# Patient Record
Sex: MALE | Race: WHITE | NOT HISPANIC OR LATINO | Employment: OTHER | URBAN - METROPOLITAN AREA
[De-identification: names, ages, dates, MRNs, and addresses within clinical notes are randomized per-mention and may not be internally consistent; named-entity substitution may affect disease eponyms.]

---

## 2023-08-10 ENCOUNTER — HOSPITAL ENCOUNTER (OUTPATIENT)
Dept: DATA CONVERSION | Facility: HOSPITAL | Age: 63
End: 2023-08-10
Attending: OPHTHALMOLOGY | Admitting: OPHTHALMOLOGY
Payer: COMMERCIAL

## 2023-08-10 DIAGNOSIS — H04.552 ACQUIRED STENOSIS OF LEFT NASOLACRIMAL DUCT: ICD-10-CM

## 2023-08-27 PROBLEM — S61.208A: Status: ACTIVE | Noted: 2023-08-27

## 2023-08-27 PROBLEM — T14.8XXA WOUND INFECTION: Status: ACTIVE | Noted: 2023-08-27

## 2023-08-27 PROBLEM — R04.0 EPISTAXIS: Status: ACTIVE | Noted: 2023-08-27

## 2023-08-27 PROBLEM — G51.9 ABNORMALITY OF FACIAL NERVE: Status: ACTIVE | Noted: 2023-08-27

## 2023-08-27 PROBLEM — K21.9 GERD (GASTROESOPHAGEAL REFLUX DISEASE): Status: ACTIVE | Noted: 2023-08-27

## 2023-08-27 PROBLEM — H02.532 EYELID RETRACTION RIGHT LOWER EYELID: Status: ACTIVE | Noted: 2023-08-27

## 2023-08-27 PROBLEM — R13.11 ORAL MOTOR DYSFUNCTION: Status: ACTIVE | Noted: 2023-08-27

## 2023-08-27 PROBLEM — S01.80XA OPEN WOUND OF FACE, COMPLICATED: Status: ACTIVE | Noted: 2023-08-27

## 2023-08-27 PROBLEM — S01.80XA: Status: ACTIVE | Noted: 2023-08-27

## 2023-08-27 PROBLEM — S62.639B OPEN FRACTURE OF FINGER, DISTAL PHALANX: Status: ACTIVE | Noted: 2023-08-27

## 2023-08-27 PROBLEM — J32.0 CHRONIC MAXILLARY SINUSITIS: Status: ACTIVE | Noted: 2023-08-27

## 2023-08-27 PROBLEM — S05.40XA: Status: ACTIVE | Noted: 2023-08-27

## 2023-08-27 PROBLEM — F41.1 GAD (GENERALIZED ANXIETY DISORDER): Status: ACTIVE | Noted: 2023-08-27

## 2023-08-27 PROBLEM — R09.81 NASAL CONGESTION: Status: ACTIVE | Noted: 2023-08-27

## 2023-08-27 PROBLEM — H90.3 SENSORINEURAL HEARING LOSS (SNHL) OF BOTH EARS: Status: ACTIVE | Noted: 2023-08-27

## 2023-08-27 PROBLEM — R11.0 NAUSEA: Status: ACTIVE | Noted: 2023-08-27

## 2023-08-27 PROBLEM — R29.898 WEAKNESS OF SHOULDER: Status: ACTIVE | Noted: 2023-08-27

## 2023-08-27 PROBLEM — S68.119A AMPUTATION FINGER: Status: ACTIVE | Noted: 2023-08-27

## 2023-08-27 PROBLEM — S68.625A: Status: ACTIVE | Noted: 2023-08-27

## 2023-08-27 PROBLEM — G24.9 DYSKINESIA: Status: ACTIVE | Noted: 2023-08-27

## 2023-08-27 PROBLEM — H02.103 ECTROPION OF RIGHT EYE: Status: ACTIVE | Noted: 2023-08-27

## 2023-08-27 PROBLEM — H93.13 TINNITUS OF BOTH EARS: Status: ACTIVE | Noted: 2023-08-27

## 2023-08-27 PROBLEM — J31.0 CHRONIC RHINITIS: Status: ACTIVE | Noted: 2023-08-27

## 2023-08-27 PROBLEM — K59.00 CONSTIPATION: Status: ACTIVE | Noted: 2023-08-27

## 2023-08-27 PROBLEM — H04.202 EPIPHORA OF LEFT SIDE: Status: ACTIVE | Noted: 2023-08-27

## 2023-08-27 PROBLEM — C44.329 SQUAMOUS CELL CANCER OF SKIN OF RIGHT CHEEK: Status: ACTIVE | Noted: 2023-08-27

## 2023-08-27 PROBLEM — C44.320 SQUAMOUS CELL CARCINOMA, FACE: Status: ACTIVE | Noted: 2023-08-27

## 2023-08-27 PROBLEM — H92.03 OTALGIA OF BOTH EARS: Status: ACTIVE | Noted: 2023-08-27

## 2023-08-27 PROBLEM — L08.9 WOUND INFECTION: Status: ACTIVE | Noted: 2023-08-27

## 2023-08-27 PROBLEM — H04.201 EPIPHORA OF RIGHT SIDE: Status: ACTIVE | Noted: 2023-08-27

## 2023-08-27 PROBLEM — G24.5 BLEPHAROSPASM: Status: ACTIVE | Noted: 2023-08-27

## 2023-08-27 PROBLEM — R22.0 FACIAL SWELLING: Status: ACTIVE | Noted: 2023-08-27

## 2023-08-27 PROBLEM — H02.112 CICATRICIAL ECTROPION OF RIGHT LOWER EYELID: Status: ACTIVE | Noted: 2023-08-27

## 2023-08-27 PROBLEM — K11.7 XEROSTOMIA: Status: ACTIVE | Noted: 2023-08-27

## 2023-08-27 PROBLEM — R22.0 CHEEK MASS: Status: ACTIVE | Noted: 2023-08-27

## 2023-08-27 PROBLEM — Q67.0 FACIAL ASYMMETRIES: Status: ACTIVE | Noted: 2023-08-27

## 2023-08-27 PROBLEM — L90.5 SCAR ADHERENT: Status: ACTIVE | Noted: 2023-08-27

## 2023-08-27 PROBLEM — F32.0 MAJOR DEPRESSIVE DISORDER, SINGLE EPISODE, MILD (CMS-HCC): Status: ACTIVE | Noted: 2023-08-27

## 2023-08-27 PROBLEM — H04.559 NLDO, ACQUIRED (NASOLACRIMAL DUCT OBSTRUCTION): Status: ACTIVE | Noted: 2023-08-27

## 2023-08-27 PROBLEM — H93.8X3 SENSATION OF FULLNESS IN BOTH EARS: Status: ACTIVE | Noted: 2023-08-27

## 2023-08-27 RX ORDER — ONDANSETRON 4 MG/1
4 TABLET, FILM COATED ORAL EVERY 6 HOURS PRN
COMMUNITY
Start: 2023-07-07 | End: 2023-10-04 | Stop reason: ALTCHOICE

## 2023-08-27 RX ORDER — IBUPROFEN 600 MG/1
600 TABLET ORAL EVERY 6 HOURS
COMMUNITY
Start: 2023-05-16 | End: 2023-10-04 | Stop reason: ALTCHOICE

## 2023-08-27 RX ORDER — GABAPENTIN 300 MG/1
1 CAPSULE ORAL 3 TIMES DAILY
COMMUNITY
Start: 2023-05-12 | End: 2023-10-04 | Stop reason: ALTCHOICE

## 2023-08-27 RX ORDER — BUPROPION HYDROCHLORIDE 150 MG/1
150 TABLET ORAL
COMMUNITY
Start: 2023-07-12 | End: 2023-10-04 | Stop reason: ALTCHOICE

## 2023-08-27 RX ORDER — VALACYCLOVIR HYDROCHLORIDE 1 G/1
2 TABLET, FILM COATED ORAL 2 TIMES DAILY
COMMUNITY
Start: 2023-05-10 | End: 2023-10-04 | Stop reason: ALTCHOICE

## 2023-08-27 RX ORDER — OXYCODONE HYDROCHLORIDE 5 MG/1
TABLET ORAL
COMMUNITY
Start: 2022-02-18 | End: 2023-10-04 | Stop reason: ALTCHOICE

## 2023-08-27 RX ORDER — BROMPHENIRAMINE MALEATE, PSEUDOEPHEDRINE HYDROCHLORIDE, AND DEXTROMETHORPHAN HYDROBROMIDE 2; 30; 10 MG/5ML; MG/5ML; MG/5ML
SYRUP ORAL
COMMUNITY
Start: 2022-05-17 | End: 2023-10-04 | Stop reason: ALTCHOICE

## 2023-08-27 RX ORDER — OXYCODONE AND ACETAMINOPHEN 5; 325 MG/1; MG/1
1 TABLET ORAL EVERY 6 HOURS PRN
COMMUNITY
Start: 2023-05-16 | End: 2023-10-04 | Stop reason: ALTCHOICE

## 2023-08-27 RX ORDER — OXCARBAZEPINE 300 MG/1
300 TABLET, FILM COATED ORAL 2 TIMES DAILY
COMMUNITY
Start: 2023-07-07 | End: 2023-10-04 | Stop reason: ALTCHOICE

## 2023-08-27 RX ORDER — OXCARBAZEPINE 150 MG/1
150 TABLET, FILM COATED ORAL 2 TIMES DAILY
COMMUNITY
Start: 2023-06-30 | End: 2023-10-04 | Stop reason: ALTCHOICE

## 2023-08-27 RX ORDER — PREGABALIN 50 MG/1
50 CAPSULE ORAL 2 TIMES DAILY
COMMUNITY
Start: 2023-06-17 | End: 2023-10-04 | Stop reason: ALTCHOICE

## 2023-09-30 NOTE — H&P
History of Present Illness:   History Present Illness:  Reason for surgery: left acquired NLDO (carboplatin  vs radiation induced)   HPI:    Patient is a 62 y/o M with left acquired NLDO (carboplatin vs radiation induced) who is undergoing left dacryocystorhinostomy with Arvizu tube on 8/10/23.    Allergies:        Allergies:  ·  No Known Allergies :     Home Medication Review:   Home Medications Reviewed: yes     Impression/Procedure:   ·  Impression and Planned Procedure: left acquired NLDO (carboplatin vs radiation induced) who is undergoing left dacryocystorhinostomy with Arvizu tube       ERAS (Enhanced Recovery After Surgery):  ·  ERAS Patient: no       Physical Exam by System:    Constitutional: Well developed, awake/alert/oriented  x3, no distress, alert and cooperative   Eyes: left nasolacrimal duct obstruction   Respiratory/Thorax: CTAB, no wheezes, rales, rhonci   Cardiovascular: Regular, rate and rhythm, no murmurs,  2+ equal pulses of the extremities, normal S 1and S 2   Gastrointestinal: Nondistended, soft, non-tender,  no rebound tenderness or guarding, no masses palpable, no organomegaly, +BS, no bruits   Psychological: Appropriate mood and behavior     Consent:   COVID-19 Consent:  ·  COVID-19 Risk Consent Surgeon has reviewed key risks related to the risk of yuniel COVID-19 and if they contract COVID-19 what the risks are.     Attestation:   Note Completion:  I am a:  Resident/Fellow   Attending Attestation I saw and evaluated the patient.  I personally obtained the key and critical portions of the history and physical exam or was physically present for key and  critical portions performed by the resident/fellow. I reviewed the resident/fellow?s documentation and discussed the patient with the resident/fellow.  I agree with the resident/fellow?s medical decision making as documented in the note.     I personally evaluated the patient on 09-Aug-2023         Electronic  Signatures:  Elier Echavarria (Resident))  (Signed 09-Aug-2023 17:33)   Authored: History of Present Illness, Allergies, Home  Medication Review, Impression/Procedure, ERAS, Physical Exam, Consent, Note Completion  Albert Ziegler)  (Signed 10-Aug-2023 08:42)   Authored: Note Completion   Co-Signer: History of Present Illness, Allergies, Home Medication Review, Impression/Procedure, ERAS, Physical Exam, Consent, Note Completion      Last Updated: 10-Aug-2023 08:42 by Albert Ziegler)

## 2023-10-01 NOTE — OP NOTE
Post Operative Note:     PreOp Diagnosis: Left nasolacrimal duct obstruction,  Left epiphora   Post-Procedure Diagnosis: Left nasolacrimal duct  obstruction, Left epiphora   Procedure: 1. Left external dacryocystorhinostomy   2. Left Curry-canalicular Spence tube   Surgeon: Albert Ziegler MD   Resident/Fellow/Other Assistant: Esha Kowalski MD,  Elier Echavarria MD   Anesthesia: General   Estimated Blood Loss (mL): <2 cc   Specimen: no   Complications: None   Findings: Left nasolacrimal duct obstruction     Operative Report Dictated:  Dictation: not applicable - note contains Operative  Report   Operative Report:    Preoperative diagnosis: left  nasolacrimal duct obstruction  Postoperative diagnosis: same  Procedure: left dacryocystorhinostomy (DCR), Left monocanalicular spence  tube  Surgeon: Albert Ziegler who was present and scrubbed throughout all critical portions of the operation  Assistants: Esha Kowalski MD and Elier Echavarria MD  Anesthesia: General  EBL: less than 5 cc    Description of the procedure:     The patient was taken to the operating room and placed on the table in the supine position, where anesthesia was induced. 2% lidocaine  with epinephrine and 0.5% marcaine in a 1:1 fashion was injected over the surgical site, and the patient was prepped and draped in the usual manner for orbitofacial surgery.     A corneal protector was placed in the operative eye. Afrin soaked cottonoids were placed in the ipsilateral nasal vault.    A 15 Bard-Godfrey blade incision was made over the *** anterior lacrimal crest. Sharp dissection was carried down to the lacrimal crest  periosteum. The periosteum was elevated with a Tenzel periosteal elevator. A rhinostomy was created through the lacrimal sac fossa. The rhinostomy was enlarged to a dimension of 10 mm x 15 mm with Kerrison rongeurs. The nasal mucosa was opened vertically  with electrocauterization. The lacrimal sac was opened vertically with sharp  dissection. Purulent material was irrigated from the lacrimal sac. The posterior nasal mucosal flap was sutured to the posterior lacrimal flap with 5-0 Vicryl suture. Arvizu  tubing was inserted through the lower canaliculus and brought through the rhinostomy and into the nos e. The anterior flaps were  sutured with 5-0 Vicryl suture.  The medial canthal tendon was reanchored with 6-0 vicryl suture. The skin was closed with 5-0 fast absorbing suture.   The corneal protectors were removed and antibiotic ophthalmic ointment was placed over the surgical site.     The patient was then awakened and taken from the operating room in good condition, having tolerated the procedure well. There were no complications,  and the estimated blood loss was less than 5 cc.      Attestation:   Note Completion:  I am a: Resident/Fellow   Attending Attestation I was present for the entire procedure          Electronic Signatures:  Esha Kowalski (Resident))  (Signed 10-Aug-2023 09:59)   Authored: Post Operative Note, Note Completion  Albert Ziegler)  (Signed 24-Aug-2023 10:37)   Authored: Post Operative Note, Note Completion   Co-Signer: Post Operative Note, Note Completion      Last Updated: 24-Aug-2023 10:37 by Albert Ziegler)

## 2023-10-04 ENCOUNTER — OFFICE VISIT (OUTPATIENT)
Dept: BEHAVIORAL HEALTH | Facility: HOSPITAL | Age: 63
End: 2023-10-04
Payer: COMMERCIAL

## 2023-10-04 VITALS
SYSTOLIC BLOOD PRESSURE: 129 MMHG | TEMPERATURE: 98.1 F | DIASTOLIC BLOOD PRESSURE: 66 MMHG | WEIGHT: 205.25 LBS | BODY MASS INDEX: 27.8 KG/M2 | RESPIRATION RATE: 16 BRPM | HEIGHT: 72 IN | OXYGEN SATURATION: 98 % | HEART RATE: 67 BPM

## 2023-10-04 DIAGNOSIS — F41.1 GAD (GENERALIZED ANXIETY DISORDER): ICD-10-CM

## 2023-10-04 DIAGNOSIS — F32.0 MAJOR DEPRESSIVE DISORDER, SINGLE EPISODE, MILD (CMS-HCC): ICD-10-CM

## 2023-10-04 PROCEDURE — 1036F TOBACCO NON-USER: CPT | Performed by: PSYCHIATRY & NEUROLOGY

## 2023-10-04 PROCEDURE — 99215 OFFICE O/P EST HI 40 MIN: CPT | Mod: AM | Performed by: PSYCHIATRY & NEUROLOGY

## 2023-10-04 PROCEDURE — 99215 OFFICE O/P EST HI 40 MIN: CPT | Performed by: PSYCHIATRY & NEUROLOGY

## 2023-10-04 RX ORDER — BUPROPION HYDROCHLORIDE 150 MG/1
150 TABLET, EXTENDED RELEASE ORAL EVERY MORNING
COMMUNITY
Start: 2023-09-06 | End: 2024-04-03 | Stop reason: SDUPTHER

## 2023-10-04 ASSESSMENT — PAIN SCALES - GENERAL: PAINLEVEL: 0-NO PAIN

## 2023-10-04 NOTE — PROGRESS NOTES
Patient  Juan Miguel Ballesteros is a 63 y.o. male, presented for a follow-up for   Chief Complaint   Patient presents with    Follow-up    Anxiety    Depression   .      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 The medical illness besides history of basal cell carcinoma of torso, no known psychiatric history. Reports that he took long-term at the end of 2020. He was hoping to enjoy his long-term, as he saw his father die at a young age and never getting a chance to enjoy long-term. He, however, got diagnosed with recurrence of his squamous cell cancer that had a negative impact on his long-term plans and motivation. In this context he reports that his depressed mood.     On interview, he reports that he tried bupropion SR twice a day but was not able to sleep at night, so he has been taking it once a day in morning. This has resolved his insomnia issue. Reports that he generally feels well. Reports his anxietya nd depression as low, 3/10, where 10=worst depression and anxiety today. However, reports that his depression and anxiety fluctuates. He reports that some days he feels very discontinues to report deprssed mood, that flctucuates based on his day. Continus to report diminished energy in afternoon, but iriability and sleep are better. Denies thughts abot death and dyin. Denies suicidal ideas, intent or plans; Denies history thereof. Has been able to go to work, able to go to gym etc. No hopelessness. Denies feeling helpless, hopeless, worthless. Denies any homicidal ideation, intent or plan. He denies any manic, panic or psychotic symptoms. Denies any hallucinations, ideas of reference, paranoia. He denies any  "illicit drug use or alcohol use.     He talked about how he feels lonely, especially when he is returning home in evening, \"I take the long way home ...\" And when he wakes up in morning. Discussed his feeling of loneliness, missing companionship. He feels more \"alive and energetic\" when he is with other people, at gym or at work. He also told me that he is , his ex-wife re- about a year and half ago. \"I am real happy for her.\" Talked briefly about his marriage, together for 13 years. \"We argued about not spending time together because of work, all the time that we were together ... It was so stupid .. She went her way and I went mine.\" He talked about not being able to form any meaningful romantic relationship because \"what would she say about ....\" Variety of issues including his minor facial deformity s/p surgery, etc. However, we clarified that he has not been in any romantic relationship even before cancer/surgery. \"I am my worst enemy.\" Talked about being a harsh  of self. Encouraged to bring these issues with his therapist. He has been seeing psychology for individual therapy.      Discussed medication changes, increase in wellbutrin, he wuld lik to contnue with current dose of bupropion.       Past Psychiatric History:   Previous therapy: no  Previous psychiatric treatment and medication trials: yes - wellbutring  Previous psychiatric hospitalizations: no  Previous diagnoses: no  Previous suicide attempts: no  History of violence: no  Depression screening was performed with standardized tool: Not Screened    Past Medical History  Past Medical History:   Diagnosis Date    Chronic maxillary sinusitis 04/05/2022    Chronic maxillary sinusitis    Localized swelling, mass and lump, head 06/16/2022    Facial swelling    Personal history of other malignant neoplasm of skin     History of other malignant neoplasm of skin    Personal history of other specified conditions 12/14/2021    History of " nasal congestion    Sensorineural hearing loss, bilateral 11/29/2021    Sensorineural hearing loss (SNHL) of both ears    Squamous cell carcinoma of skin of other parts of face 10/11/2022    Squamous cell carcinoma of skin of orbital rim    Tinnitus, bilateral 11/29/2021    Tinnitus of both ears    Unspecified ectropion of unspecified eye, unspecified eyelid 06/16/2022    Ectropion    Unspecified epiphora, right side 08/18/2022    Epiphora of right side    Unspecified epiphora, unspecified side 04/11/2022    Epiphora       Surgical History  Past Surgical History:   Procedure Laterality Date    OTHER SURGICAL HISTORY  04/02/2021    Tonsillectomy        Family History:  Family History   Problem Relation Name Age of Onset    Uterine cancer Mother      Lymphoma Father         Social History:  Social History     Socioeconomic History    Marital status:      Spouse name: Not on file    Number of children: Not on file    Years of education: Not on file    Highest education level: Not on file   Occupational History    Not on file   Tobacco Use    Smoking status: Never    Smokeless tobacco: Never   Substance and Sexual Activity    Alcohol use: Not on file    Drug use: Not on file    Sexual activity: Not on file   Other Topics Concern    Not on file   Social History Narrative    Not on file     Social Determinants of Health     Financial Resource Strain: Not on file   Food Insecurity: Not on file   Transportation Needs: Not on file   Physical Activity: Not on file   Stress: Not on file   Social Connections: Not on file   Intimate Partner Violence: Not on file   Housing Stability: Not on file        Allergies  Patient has no allergy information on record.    Review of Systems    Psychiatric ROS - Adult  Anxiety: General Anxiety Disorder (SIMONA)SIMONA Behaviors: difficult to control worry, excessive anxiety/worry, difficulty concentrating, and irritability  Depression: energy, guilt, interest, and tearfulness  Delirium:  negative  Psychosis: negative  Mara: negative  Safety Issues: none  Psychiatric ROS Comment: none    Medication:    Current Outpatient Medications:     buPROPion SR (Wellbutrin SR) 150 mg 12 hr tablet, Take 1 tablet (150 mg) by mouth once daily in the morning., Disp: , Rfl:      Allergies:  No Known Allergies     Vitals:  Visit Vitals  /66 (BP Location: Left arm, Patient Position: Sitting, BP Cuff Size: Adult)   Pulse 67   Temp 36.7 °C (98.1 °F) (Temporal)   Resp 16        Mental Status Exam  Appearance: well-groomed.   Build: average.   Demeanor: average.   Eye Contact: average.   Motor Activity: average.   Speech: clear.   Language: Neurologic language is intact.   Fund of Knowledge: intact fund of knowledge.   Delusions: None Reported.   Self Harm: None Reported.   Aggressive: None Reported.   Mood: depressed.   Affect: restricted.   Orientation: alert, oriented x3.   Manner: cooperative.   Thought process: goal-directed, logical.   Thought association: displays rational thought process.   Content of thought: As noted in HPI   Abstract/ Rational Thought: intact   Memory: grossly intact.   Cognition: intact.   Insight: intact.   Judgement: intact.    Psychiatric Risk Assessment  Violence Risk Assessment: none  Acute Risk of Harm to Others is Considered: low   Suicide Risk Assessment: none  Protective Factors against Suicide: none  Acute Risk of Harm to Self is Considered: low    Labs:  No results found for this or any previous visit (from the past 96 hour(s)).      Assessment/Plan   There are no diagnoses linked to this encounter.    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 Depression appears to be multifactorial, as his cancer recurred when he decided to take alf and has also been  struggling financially. He was seen for a few months in 2022, was not amenable to antidepressant medication at the time and worked on CBT with some imprvement. Now resturns and is amenable to try medication.      Discussed r/b/a of medication in detail, answered all his questions to his satisfaction.      Plan:  1. Major depressive disorder, recrrent, moderate; SIMONA  -Change bupropion SR 150mg PO QAM  - Continue individual therapy with psychology  -Provided supportive therapy focused on coping.  -No acute safety issues.       I spent 60 minutes in the professional and overall care of this patient.      Medication Consent  Medication Consent: risks, benefits, side effects reviewed for all ordered meds    Ricardo Becker MD

## 2023-10-13 ENCOUNTER — OFFICE VISIT (OUTPATIENT)
Dept: BEHAVIORAL HEALTH | Facility: CLINIC | Age: 63
End: 2023-10-13
Payer: COMMERCIAL

## 2023-10-13 DIAGNOSIS — F32.0 CURRENT MILD EPISODE OF MAJOR DEPRESSIVE DISORDER WITHOUT PRIOR EPISODE (CMS-HCC): ICD-10-CM

## 2023-10-13 PROCEDURE — 1036F TOBACCO NON-USER: CPT | Performed by: PSYCHOLOGIST

## 2023-10-13 PROCEDURE — 90837 PSYTX W PT 60 MINUTES: CPT | Performed by: PSYCHOLOGIST

## 2023-10-14 NOTE — PROGRESS NOTES
"12 PM [id number] Indiv Psych for MDD (60 MIN, 1203-105). Sixth meeting. CBT/Supportive Therapy. Saw psychiatrist discussing medications. Taking bupropion. Feeling about the same. Feeling depressed, lonely, irritable. Upon waking, feeling more down and negative but usually able to mobilize. Did speak with ladies at work, busy on Mondays, so harder to be on time that date but felt ok and glad he'd spoken to them. Getting to gym, felt bad a few members got him several months membership and he felt guilt. \"Felt like a loser\". Person said patient's support has meant a lot to him but patient having difficulty accepting. \"How  low can you go.\" Continues to have enough money but barely, still getting to [place] for [employer] games with others. Wishes he were better financially so could travel, do more he sees others doing. Didn't expect to get cancer and have large medical bills. Discussed ABCD model, and his trying to apply this with emphasis on how he responds to SY's. Discussed how his getting more involved socially, establishing some new friends might help. Played softball in previous years but not this year because he had to work. Next: 3 weeks.   "

## 2023-10-31 ENCOUNTER — HOSPITAL ENCOUNTER (OUTPATIENT)
Dept: RADIOLOGY | Facility: HOSPITAL | Age: 63
Discharge: HOME | End: 2023-10-31
Payer: COMMERCIAL

## 2023-10-31 ENCOUNTER — LAB (OUTPATIENT)
Dept: LAB | Facility: HOSPITAL | Age: 63
End: 2023-10-31
Payer: COMMERCIAL

## 2023-10-31 ENCOUNTER — OFFICE VISIT (OUTPATIENT)
Dept: HEMATOLOGY/ONCOLOGY | Facility: HOSPITAL | Age: 63
End: 2023-10-31
Payer: COMMERCIAL

## 2023-10-31 VITALS
OXYGEN SATURATION: 98 % | HEIGHT: 71 IN | SYSTOLIC BLOOD PRESSURE: 128 MMHG | TEMPERATURE: 97.2 F | HEART RATE: 67 BPM | WEIGHT: 203 LBS | DIASTOLIC BLOOD PRESSURE: 57 MMHG | RESPIRATION RATE: 16 BRPM | BODY MASS INDEX: 28.42 KG/M2

## 2023-10-31 DIAGNOSIS — S04.51XD: ICD-10-CM

## 2023-10-31 DIAGNOSIS — C44.329 SQUAMOUS CELL CANCER OF SKIN OF RIGHT CHEEK: Primary | ICD-10-CM

## 2023-10-31 DIAGNOSIS — R22.0 LOCALIZED SWELLING, MASS AND LUMP, HEAD: ICD-10-CM

## 2023-10-31 DIAGNOSIS — K11.7 XEROSTOMIA: ICD-10-CM

## 2023-10-31 DIAGNOSIS — Z85.89 ENCOUNTER FOR FOLLOW-UP SURVEILLANCE OF HEAD AND NECK CANCER: ICD-10-CM

## 2023-10-31 DIAGNOSIS — Z08 ENCOUNTER FOR FOLLOW-UP SURVEILLANCE OF HEAD AND NECK CANCER: ICD-10-CM

## 2023-10-31 DIAGNOSIS — C44.329 SQUAMOUS CELL CARCINOMA OF SKIN OF OTHER PARTS OF FACE: ICD-10-CM

## 2023-10-31 DIAGNOSIS — C44.320 SQUAMOUS CELL CARCINOMA OF SKIN OF UNSPECIFIED PARTS OF FACE: ICD-10-CM

## 2023-10-31 DIAGNOSIS — R51.9 HEADACHE, UNSPECIFIED: ICD-10-CM

## 2023-10-31 LAB
ALBUMIN SERPL BCP-MCNC: 4.2 G/DL (ref 3.4–5)
ALP SERPL-CCNC: 41 U/L (ref 33–136)
ALT SERPL W P-5'-P-CCNC: 25 U/L (ref 10–52)
ANION GAP SERPL CALC-SCNC: 10 MMOL/L (ref 10–20)
AST SERPL W P-5'-P-CCNC: 25 U/L (ref 9–39)
BASOPHILS # BLD AUTO: 0.02 X10*3/UL (ref 0–0.1)
BASOPHILS NFR BLD AUTO: 0.5 %
BILIRUB SERPL-MCNC: 1 MG/DL (ref 0–1.2)
BUN SERPL-MCNC: 16 MG/DL (ref 6–23)
CALCIUM SERPL-MCNC: 9.4 MG/DL (ref 8.6–10.3)
CHLORIDE SERPL-SCNC: 105 MMOL/L (ref 98–107)
CO2 SERPL-SCNC: 29 MMOL/L (ref 21–32)
CREAT SERPL-MCNC: 1.08 MG/DL (ref 0.5–1.3)
EOSINOPHIL # BLD AUTO: 0.12 X10*3/UL (ref 0–0.7)
EOSINOPHIL NFR BLD AUTO: 3.3 %
ERYTHROCYTE [DISTWIDTH] IN BLOOD BY AUTOMATED COUNT: 12.4 % (ref 11.5–14.5)
GFR SERPL CREATININE-BSD FRML MDRD: 78 ML/MIN/1.73M*2
GLUCOSE SERPL-MCNC: 82 MG/DL (ref 74–99)
HCT VFR BLD AUTO: 41.7 % (ref 41–52)
HGB BLD-MCNC: 13.9 G/DL (ref 13.5–17.5)
IMM GRANULOCYTES # BLD AUTO: 0.01 X10*3/UL (ref 0–0.7)
IMM GRANULOCYTES NFR BLD AUTO: 0.3 % (ref 0–0.9)
LYMPHOCYTES # BLD AUTO: 1.02 X10*3/UL (ref 1.2–4.8)
LYMPHOCYTES NFR BLD AUTO: 27.8 %
MCH RBC QN AUTO: 30.4 PG (ref 26–34)
MCHC RBC AUTO-ENTMCNC: 33.3 G/DL (ref 32–36)
MCV RBC AUTO: 91 FL (ref 80–100)
MONOCYTES # BLD AUTO: 0.47 X10*3/UL (ref 0.1–1)
MONOCYTES NFR BLD AUTO: 12.8 %
NEUTROPHILS # BLD AUTO: 2.03 X10*3/UL (ref 1.2–7.7)
NEUTROPHILS NFR BLD AUTO: 55.3 %
NRBC BLD-RTO: 0 /100 WBCS (ref 0–0)
PLATELET # BLD AUTO: 191 X10*3/UL (ref 150–450)
PMV BLD AUTO: 9 FL (ref 7.5–11.5)
POTASSIUM SERPL-SCNC: 4.2 MMOL/L (ref 3.5–5.3)
PROT SERPL-MCNC: 7.1 G/DL (ref 6.4–8.2)
RBC # BLD AUTO: 4.57 X10*6/UL (ref 4.5–5.9)
SODIUM SERPL-SCNC: 140 MMOL/L (ref 136–145)
WBC # BLD AUTO: 3.7 X10*3/UL (ref 4.4–11.3)

## 2023-10-31 PROCEDURE — 36415 COLL VENOUS BLD VENIPUNCTURE: CPT

## 2023-10-31 PROCEDURE — 80053 COMPREHEN METABOLIC PANEL: CPT

## 2023-10-31 PROCEDURE — 99215 OFFICE O/P EST HI 40 MIN: CPT | Performed by: STUDENT IN AN ORGANIZED HEALTH CARE EDUCATION/TRAINING PROGRAM

## 2023-10-31 PROCEDURE — A9575 INJ GADOTERATE MEGLUMI 0.1ML: HCPCS | Performed by: OTOLARYNGOLOGY

## 2023-10-31 PROCEDURE — 1036F TOBACCO NON-USER: CPT | Performed by: STUDENT IN AN ORGANIZED HEALTH CARE EDUCATION/TRAINING PROGRAM

## 2023-10-31 PROCEDURE — 85025 COMPLETE CBC W/AUTO DIFF WBC: CPT

## 2023-10-31 PROCEDURE — 70553 MRI BRAIN STEM W/O & W/DYE: CPT

## 2023-10-31 PROCEDURE — 2550000001 HC RX 255 CONTRASTS: Performed by: OTOLARYNGOLOGY

## 2023-10-31 PROCEDURE — 70543 MRI ORBT/FAC/NCK W/O &W/DYE: CPT

## 2023-10-31 PROCEDURE — 70543 MRI ORBT/FAC/NCK W/O &W/DYE: CPT | Performed by: STUDENT IN AN ORGANIZED HEALTH CARE EDUCATION/TRAINING PROGRAM

## 2023-10-31 PROCEDURE — 70553 MRI BRAIN STEM W/O & W/DYE: CPT | Performed by: STUDENT IN AN ORGANIZED HEALTH CARE EDUCATION/TRAINING PROGRAM

## 2023-10-31 RX ORDER — GADOTERATE MEGLUMINE 376.9 MG/ML
20 INJECTION INTRAVENOUS
Status: COMPLETED | OUTPATIENT
Start: 2023-10-31 | End: 2023-10-31

## 2023-10-31 RX ORDER — BISMUTH SUBSALICYLATE 262 MG
1 TABLET,CHEWABLE ORAL DAILY
COMMUNITY

## 2023-10-31 RX ADMIN — GADOTERATE MEGLUMINE 18 ML: 376.9 INJECTION INTRAVENOUS at 13:30

## 2023-10-31 ASSESSMENT — ENCOUNTER SYMPTOMS
VOMITING: 0
LIGHT-HEADEDNESS: 0
HEADACHES: 0
DIARRHEA: 0
ARTHRALGIAS: 0
NUMBNESS: 0
ABDOMINAL PAIN: 0
SHORTNESS OF BREATH: 0
OCCASIONAL FEELINGS OF UNSTEADINESS: 0
SORE THROAT: 0
CONSTIPATION: 0
CHILLS: 0
DEPRESSION: 0
LEG SWELLING: 0
FEVER: 0
NAUSEA: 0
COUGH: 0
TROUBLE SWALLOWING: 0
LOSS OF SENSATION IN FEET: 0

## 2023-10-31 ASSESSMENT — COLUMBIA-SUICIDE SEVERITY RATING SCALE - C-SSRS
2. HAVE YOU ACTUALLY HAD ANY THOUGHTS OF KILLING YOURSELF?: NO
1. IN THE PAST MONTH, HAVE YOU WISHED YOU WERE DEAD OR WISHED YOU COULD GO TO SLEEP AND NOT WAKE UP?: NO
6. HAVE YOU EVER DONE ANYTHING, STARTED TO DO ANYTHING, OR PREPARED TO DO ANYTHING TO END YOUR LIFE?: NO

## 2023-10-31 ASSESSMENT — PATIENT HEALTH QUESTIONNAIRE - PHQ9
SUM OF ALL RESPONSES TO PHQ9 QUESTIONS 1 AND 2: 0
1. LITTLE INTEREST OR PLEASURE IN DOING THINGS: NOT AT ALL
2. FEELING DOWN, DEPRESSED OR HOPELESS: NOT AT ALL

## 2023-10-31 ASSESSMENT — PAIN SCALES - GENERAL: PAINLEVEL: 0-NO PAIN

## 2023-10-31 NOTE — PROGRESS NOTES
Patient ID: Juan Miguel Ballesteros is a 63 y.o. male.  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 Edward Pennington  [facility]: Dr. Kyunghee Burkitt  [facility]: Dr. Jose Alberto May    Prior Therapy  6/16 - 7/27/21: Received 2 cycles of HD Cisplatin (100mg/m2), 3rd  dose held d/t neutropenia.  Right sinus protons 50 gy in 25 fx. Boost protons 10 gy in 5 fx     Surgery  4/26/21: Lateral rhinotomy with excision of right facial soft tissue mass, partial maxillectomy. Right submandibular gland excision with neck exploration for vessels  12/27/21:  Right medial maxillectomy, left nasal endoscopy with lysis of adhesion,  right inferior turbinate resection, left inferior turbinate reduction, outfracture.   2/20/22: Fat grafting to the right midface and periorbita  6/23/22: Bilateral Arvizu tube placement    Current Oncologic Issues  Epiphora  Facial Neuropathy    ONCOLOGIC HISTORY  -  8/2020:  he started feeling a lump near the medial aspect of his right orbital rim. This was painful and it was causing him excruciating discomfort on palpation.   - 10/1/2020: S/p resection of right lower eyelid lesion, pathology showed poorly differentiated SCC.    - 10/2/20 [facility] Tumor Board: Presented with Right orbital mass, enlarging and painful, underwent local resection at outside facility with findings stated to be poorly differentiated squamous cell carcinoma, presents for further treatment recommendations.  Rec:  Further treatment recommendations pending pathology review  - 11/6/2020: PET, MRI showed no lesions.  Primary lesion is not clear.   - 3/2021: Recurrent disease in right orbital rim  - 4/2/21: MRI face showed abnormal enhancing soft tissue mass in an infraorbital location along the right cheek measuring  approximately 18 mm x 10 mm in transaxial dimensions by 20 mm in craniocaudal dimension.   -  4/26/21: resection of a large subcutaneous  squamous cell carcinoma of the right suborbital region, pathology showed poorly differentiated SCC (1.7cm), was found to have this ill-defined lesion with positive perineural extension. The infraorbital nerve  was tracked back all the way into the foramen rotundum. The final margin on the nerve was negative. He was presented at our tumor board and it was felt that he should undergo a combination of radiation and chemotherapy.   - 5/21/21 [facility] tumor Board History of right orbital rim squamous cell carcinoma S/P resection (2020), with pain and numbness with recurrent mass, underwent surgical resection, findings c/w cutaneous squamous cell carcinoma, Stage: T3 N0 M0 Rec: Radiation  +/- chemotherapy  - 6/16 - 7/27/21 : Received 2 cycles of high dose cisplatin, 3rd dose not given due to neutropenia. Right sinus protons 50 gy in 25 fx. Boost protons 10  gy in 5 fx  - 10/28/21: MRI orbit showed CONNOR.   - 4/7/23 [facility]: Reviewed 3/30/23 MRI Face, demonstrates post op changes and artifact noted. Asymmetrical  enhancement of V2 on right side.  No cavernous sinus involvement noted. Nothing noted on physical exam. Rec: Watchful waiting, repeat imaging and follow patient symptoms.              Past Medical History:   Past Medical History:  04/05/2022: Chronic maxillary sinusitis      Comment:  Chronic maxillary sinusitis  06/16/2022: Localized swelling, mass and lump, head      Comment:  Facial swelling  No date: Personal history of other malignant neoplasm of skin      Comment:  History of other malignant neoplasm of skin  12/14/2021: Personal history of other specified conditions      Comment:  History of nasal congestion  11/29/2021: Sensorineural hearing loss, bilateral      Comment:  Sensorineural hearing loss (SNHL) of both ears  10/11/2022: Squamous cell carcinoma of skin of other parts of face      Comment:  Squamous cell carcinoma of skin of orbital rim  11/29/2021: Tinnitus, bilateral      Comment:  Tinnitus of both  ears  06/16/2022: Unspecified ectropion of unspecified eye, unspecified   eyelid      Comment:  Ectropion  08/18/2022: Unspecified epiphora, right side      Comment:  Epiphora of right side  04/11/2022: Unspecified epiphora, unspecified side      Comment:  Epiphora   Surgical History:    Past Surgical History:   Procedure Laterality Date    OTHER SURGICAL HISTORY  04/02/2021    Tonsillectomy      Family History:    Family History   Problem Relation Name Age of Onset    Uterine cancer Mother      Lymphoma Father       Family Oncology History:    Cancer-related family history includes Lymphoma in his father; Uterine cancer in his mother.  Social History:    Social History     Tobacco Use    Smoking status: Never    Smokeless tobacco: Never          Subjective   Chief Complaint: Squamous Cell Carcinoma of right orbit, follow up    HPI  Interval History  Juan Miguel Ballesteros is a 63 y.o. male with h/o right orbital SCC in 8/2020, s/p resection, but unfortunately recurred in 3/2021, s/p resection with findings of extensive perineural involvement. S/p concurrent chemoradiation  with HD Cisplatin x2, protons 50 gy in 25 fx,  boost protons 10 gy in 5 fx, completed 7/27/21.     Patient presents today for 2 year 3 months follow up, he reports the following:     Still has right facial spasms/neuropathic pain that comes and goes. There is no pattern to the onset of these right facial pains. He was following supp onc for this. Felt very fatigued from gabapentin and pregabalin and discontinued.   The right face is less puffy, less red. However quality of pain is the same, still a sharp, stabbing pain that feels like he was punched in the face. Pain would last 5 -30 min or so. Right upper lip also feels numb.   Otherwise physically he's feeling well, eating well and continues to go to the [employer] [facility] for group workouts 2-3 times a week. Going to the gym after work a couple times a week with the cancer program is not in  "session.      ROS  Review of Systems   Constitutional:  Negative for chills and fever.   HENT:   Negative for hearing loss, lump/mass, mouth sores, nosebleeds, sore throat, tinnitus and trouble swallowing.         Right facial pain/neuropathy   Respiratory:  Negative for cough and shortness of breath.    Cardiovascular:  Negative for chest pain and leg swelling.   Gastrointestinal:  Negative for abdominal pain, constipation, diarrhea, nausea and vomiting.   Musculoskeletal:  Negative for arthralgias.   Skin:  Negative for rash.   Neurological:  Negative for headaches, light-headedness and numbness.       Allergies  No Known Allergies     Medications  Current Outpatient Medications   Medication Instructions    buPROPion SR (WELLBUTRIN SR) 150 mg, oral, Every morning    multivitamin tablet 1 tablet, oral, Daily          Objective   VS: /57 (BP Location: Left arm, Patient Position: Sitting, BP Cuff Size: Large adult)   Pulse 67   Temp 36.2 °C (97.2 °F)   Resp 16   Ht (S) 1.816 m (5' 11.5\")   Wt 92.1 kg (203 lb)   SpO2 98%   BMI 27.92 kg/m²   Weight: Daily Weight  10/31/23 : 92.1 kg (203 lb)  10/04/23 : 93.1 kg (205 lb 4 oz)  07/12/23 : 93 kg (205 lb 2 oz)  06/30/23 : 92.5 kg (204 lb)  06/29/23 : 92.7 kg (204 lb 5.9 oz)  06/27/23 : 93.9 kg (207 lb)  06/14/23 : 92.3 kg (203 lb 9 oz)      Physical Exam  Constitutional:       Appearance: Normal appearance. He is well-developed.   HENT:      Head: Normocephalic and atraumatic.      Comments: S/p right lateral rhinotomy. Mild erythema in right cheek, area of hardness likely fat pad     Right Ear: External ear normal. No tenderness.      Left Ear: External ear normal. No tenderness.      Nose: Nose normal.      Mouth/Throat:      Mouth: No injury or oral lesions.      Tongue: No lesions.   Eyes:      Extraocular Movements: Extraocular movements intact.      Conjunctiva/sclera: Conjunctivae normal.      Pupils: Pupils are equal, round, and reactive to light. "   Neck:      Thyroid: No thyroid mass.   Abdominal:      General: There is no abdominal bruit.   Musculoskeletal:         General: Normal range of motion.      Cervical back: Normal range of motion and neck supple. No signs of trauma. Normal range of motion.   Lymphadenopathy:      Upper Body:      Right upper body: No axillary adenopathy.      Left upper body: No axillary adenopathy.   Skin:     General: Skin is warm and dry.      Comments: No new skin lesions   Neurological:      General: No focal deficit present.      Mental Status: He is alert and oriented to person, place, and time.      Gait: Gait is intact.   Psychiatric:         Mood and Affect: Mood and affect normal.         Behavior: Behavior is cooperative.         Diagnostic Results   Labs  Below labs are reviewed today.  Results from last 7 days   Lab Units 10/31/23  0933   WBC AUTO x10*3/uL 3.7*   HEMOGLOBIN g/dL 13.9   HEMATOCRIT % 41.7   PLATELETS AUTO x10*3/uL 191   NEUTROS ABS x10*3/uL 2.03   LYMPHS ABS AUTO x10*3/uL 1.02*   MONOS ABS AUTO x10*3/uL 0.47   EOS ABS AUTO x10*3/uL 0.12   NEUTROS PCT AUTO % 55.3   LYMPHS PCT AUTO % 27.8   MONOS PCT AUTO % 12.8   EOS PCT AUTO % 3.3      Results from last 7 days   Lab Units 10/31/23  0933   GLUCOSE mg/dL 82   SODIUM mmol/L 140   POTASSIUM mmol/L 4.2   CHLORIDE mmol/L 105   CO2 mmol/L 29   BUN mg/dL 16   CREATININE mg/dL 1.08   EGFR mL/min/1.73m*2 78   CALCIUM mg/dL 9.4   ALBUMIN g/dL 4.2   PROTEIN TOTAL g/dL 7.1   BILIRUBIN TOTAL mg/dL 1.0   ALK PHOS U/L 41   ALT U/L 25   AST U/L 25                   Images       Pathology      Assessment/Plan   ASSESSMENT  Juan Miguel Ballesteros is a 63 y.o. male with right orbital SCC in 8/2020, s/p resection, but unfortunately recurred in 3/2021,  s/p resection with findings of extensive perineural involvement. S/p concurrent chemoradiation with q3 week cisplatin (6/16-7/27/21).  ------------------  Today, VSS, labs WNL, weight stable     # Recurrent right orbital SCC  - S/p  repeat resection and concurrent CRT w/ Q3week HD Cisplatin x 2, last cycle held d/t neutropenia. Last RT 7/27/21.   - 10/28/21: MRI orbit showed CONNOR.  - 11/8/21: CT facial bone showed notable right sided sinus disease  (soft tissue opacification of the right maxillary sinus, extending anteromedially to the surgical defect/at the site of the excision of the previously seen mass in the right infraorbital region/cheek region and extending posteriorly into the retromaxillary  fat, sphenopalatine fossa).  - 11/29/21: Seen by audiologist, high frequency hearing loss, patient wants to defer using hearing aids.  - 12/27/21 Right medial maxillectomy ; Left nasal endoscopy with lysis of adhesion; Right inferior turbinate  resection n4;  Left inferior turbinate reduction  - 6/29/23: Chronic right sided facial neuropathy in V2 distribution has worsened  with increasing episodes of pain daily. MRI Face on 4/1/23 notes thickening and enhancement of V2, unclear if this is disease recurrence vs post radiation changes, will monitor with MRI Face Q4 months (per Dr. Cohn). 5/23/23 biopsy of the right cheek  did not reveal malignant cells. Per Dr. Pennington,  the palpable mass could correspond to the fat graft.   - 10/31/23: Continue to have right facial neuropathy in V2 distribution. Pain comes and goes. Not on any medications for this. Per ENT, will refer to Dr. Lima. Pt has MRI Brain and MRI Face ordered by Dr. Cohn today. Will follow up on results.      # Right facial neuropathy  - Previously followed with supp onc for pain management, referred back to Krissy BLOUNT NP  - He felt very fatigued with Gabapentin and Pregabalin. Will meet with Krissy again to discuss other options     # Epiphora  - Right epiphora started Sept 2021, left epiphora started Jan 2022.  - 2/20/22: Fat Grafting to the right midface and periorbita. Pt reports did not have symptom improvement after this procedure  - 6/23/22: Bilateral probing with Arvizu tube  placement. Right lower eyelid cicatricial ectropion repair  - Still has persistent epiphora of bilateral eyes, L>R. He's considering another procedure for the left eye to drain lacrimal duct in to back to throat. this will be scheduled on 8/10/23 with Dr. Ly.       # MDD  -  Since retiring at end of 2020, daily routine has been disrupted. Reports feeling tired with low motivation to get out of bed, especially pronounced in the mornings. However once he's able to get up his energy is good.  - Still going to his [employer] classes and exercising 1-2 x/week. Recently completed a 5K run  - Established with onco-psych 5/27/22, not on antidepressants  - 2/13/23 Started on Wellbutrin XL 150mg   - Patient has a lot of anxiety and feels depressed over recent developments and feels he has failed as he is still not back to his pre-treatment baseline. Provided supportive listening.      # Xerostomia  - Dry mouth, drinks a lot of water. Food intake is normal/regular     # Health Maintenance  - D/t cancer diagnosis patient has not had regular follow up with PCP or dentist. Encouraged following up with these providers for routine health maintenance     # Calf pain: resolved  - Likely related to muscle strain from playing soft ball. Low suspicion for DVT as onset of pain follows activity. Resolved with rest.      PLAN  -- RTC in 6 months with labs prior (CBC, CMP) on 5/1/24, labs cbc, cmp, tsh/t4  -- MRI Brain and Face w/wo today. Follow up on results  -- 11/1 FUV w/ Dr. Lima  -- 1/9/24 FUV w/ Dr. Pennington  -- Follow up with Dentistry (rec 2-4x/year as patient had prior radiation to head and neck)

## 2023-11-01 ENCOUNTER — APPOINTMENT (OUTPATIENT)
Dept: OTOLARYNGOLOGY | Facility: CLINIC | Age: 63
End: 2023-11-01
Payer: COMMERCIAL

## 2023-11-03 ENCOUNTER — OFFICE VISIT (OUTPATIENT)
Dept: BEHAVIORAL HEALTH | Facility: CLINIC | Age: 63
End: 2023-11-03
Payer: COMMERCIAL

## 2023-11-03 DIAGNOSIS — F32.0 CURRENT MILD EPISODE OF MAJOR DEPRESSIVE DISORDER WITHOUT PRIOR EPISODE (CMS-HCC): ICD-10-CM

## 2023-11-03 PROCEDURE — 90837 PSYTX W PT 60 MINUTES: CPT | Performed by: PSYCHOLOGIST

## 2023-11-03 PROCEDURE — 1036F TOBACCO NON-USER: CPT | Performed by: PSYCHOLOGIST

## 2023-11-04 NOTE — PROGRESS NOTES
10 AM 14[address] Ind[facility] Psych for MDD (60 MIN, [id number])  In-person. Awaiting for results of MRI, 6 month check. MRI technician or nurse said may have to do again if doctor doesn't like images, not used to hearing that, catastrophizes about bad results/outcome. Continue to discuss link between emotions and thoughts, emphasizing identification of thought allows for potential evaluation and modification. 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 Not able to be there when project was completed, worries about work not being done right. 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 Still on wellbutrin 150 mg/day, sleeping better than when on higher dose. Discussed some distraction/mindful techniques to quiet his mind. He went to [employer] in [place], good time. Noticed when engaged in activity there's less worry. Will continue to track situations. Next: 1 month.

## 2023-11-09 ENCOUNTER — TELEPHONE (OUTPATIENT)
Dept: ANESTHESIOLOGY | Facility: HOSPITAL | Age: 63
End: 2023-11-09
Payer: COMMERCIAL

## 2023-11-09 DIAGNOSIS — C44.329 SQUAMOUS CELL CANCER OF SKIN OF RIGHT CHEEK: Primary | ICD-10-CM

## 2023-11-09 NOTE — TELEPHONE ENCOUNTER
Spoke with patient    MRI reviewed and stable    He continues to have pain    Will monitor    He is keep tracking of the spasms    Discussed use of muscle relaxant    Will discuss in person in the office    If get worse, will rescan in 3 months

## 2023-11-21 PROBLEM — E66.3 OVERWEIGHT WITH BODY MASS INDEX (BMI) 25.0-29.9: Status: ACTIVE | Noted: 2023-11-21

## 2023-11-21 PROBLEM — G89.18 POSTOPERATIVE PAIN: Status: ACTIVE | Noted: 2020-09-16

## 2023-11-21 PROBLEM — R35.0 INCREASED FREQUENCY OF URINATION: Status: ACTIVE | Noted: 2023-11-21

## 2023-11-21 PROBLEM — J34.2 DNS (DEVIATED NASAL SEPTUM): Status: ACTIVE | Noted: 2020-09-08

## 2023-11-21 PROBLEM — H02.9 EYELID LESION: Status: ACTIVE | Noted: 2020-09-08

## 2023-11-21 RX ORDER — VALACYCLOVIR HYDROCHLORIDE 1 G/1
TABLET, FILM COATED ORAL
COMMUNITY
Start: 2023-11-01

## 2023-12-01 ENCOUNTER — OFFICE VISIT (OUTPATIENT)
Dept: BEHAVIORAL HEALTH | Facility: CLINIC | Age: 63
End: 2023-12-01
Payer: COMMERCIAL

## 2023-12-01 DIAGNOSIS — F32.0 CURRENT MILD EPISODE OF MAJOR DEPRESSIVE DISORDER WITHOUT PRIOR EPISODE (CMS-HCC): ICD-10-CM

## 2023-12-01 PROCEDURE — 1036F TOBACCO NON-USER: CPT | Performed by: PSYCHOLOGIST

## 2023-12-01 PROCEDURE — 90837 PSYTX W PT 60 MINUTES: CPT | Performed by: PSYCHOLOGIST

## 2023-12-01 NOTE — PROGRESS NOTES
"11  AM [address] Ind[facility] Psych for MDD (60 MIN, [id number]).  Virtual.  CBT/Supportive Therapy. Feels like he's going \"backwards.\" Feels better after meetings but then has difficulty managing after. MRI revealed nothing new, possible radiation spots but no recurrence of cancer. Continue to discuss difficulties in managing emotions and ABCD model to capture situations. Out with niece for Thanksgiving in AM, invited to cousin's but didn't go. Cousin Joan (wife of Marty)  of cancer over past year. Feels guilt, sees their kids, gets \"vibe\" that they're short with him, that \"I made it and their mom didn't.\" Unclear if they actually feel this way, their father, Marty inviting him. Discussed importance of keeping thoughts in check and trying to see situations from different vantage points, finding he's negative when responding to situations. 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 \"I didn't take it. They'll say it's me, I'm the new marshall\", etc. Able to remind self  he's done nothing wrong and that trying to narrow down theft is normal process, not necessarily accusing him. Will continue to track situations and keep SE log. Next: 1 month.    "

## 2023-12-06 ENCOUNTER — APPOINTMENT (OUTPATIENT)
Dept: BEHAVIORAL HEALTH | Facility: HOSPITAL | Age: 63
End: 2023-12-06
Payer: COMMERCIAL

## 2024-01-05 ENCOUNTER — APPOINTMENT (OUTPATIENT)
Dept: BEHAVIORAL HEALTH | Facility: CLINIC | Age: 64
End: 2024-01-05
Payer: COMMERCIAL

## 2024-01-08 NOTE — PROGRESS NOTES
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 The tumor tracted along the infraorbital nerve all the way to the foramen rotundum. The last margin was clear. There was significant perineural invasion. He completed a combination of chemotherapy and radiation. He has some occasional discomfort in his face that happens maybe every day and will last approximately half an hour. A chest x-ray will be obtained today.     Hearing loss for which a hearing test will be obtained.     I will see him in 4 months.      Chief Complaint     Follow-up regarding the management of a squamous cell carcinoma of the soft tissue around the right orbit      History of Present Illness    This gentleman was seen at the request of a local colleague. In August 2020 he started feeling a lump near the medial aspect of his right orbital rim. This was painful and it was causing his excruciating discomfort on palpation. This was removed and surprisingly showed up as a poorly differentiated squamous cell carcinoma. He was presented at our tumor board and the origin of this lesion was not explained. I did send him for a PET scan as well as an MRI of the area. I personally reviewed both studies that were done in November 2020 and they were also read by the radiologist and no lesion could be identified. I saw him in late March 2021 with an obvious clinical recurrence. On April 26, 2021 he was brought to surgery. He was found to have this ill-defined lesion that extended in every direction and also had significant perineural extension. The infraorbital nerve was tracked back all the way into the pterygomaxillary fossa. The final margin on the nerve was negative. He was presented at our tumor board and it was felt that he should undergo a combination of radiation and chemotherapy. This was completed at the end of July 2021. He had an MRI done in October 2022 which was  negative for anything worrisome. He had a TSH level in April 2023 which was normal. His last chest x-ray was in September 2023.  He continues to have some discomfort on the right side of his face. It seems to be more common than previously. He had a CT scan in May 2023 that shows some scarlike changes around that area.  He had some further scanning in October 2023.  That MRI I personally reviewed and does not show any obvious evidence of tumor recurrence.    Physical Exam    Examination of the face and neck does not show any palpable masses. There is good eye mobility. The anterior nasal exam shows a little bit of crusting but very minimal. His right nostril is definitely smaller than the left. Palpation of the parotid, neck, thyroid feel fails to show any worrisome masses or adenopathies. The ear examination is also negative. The intraoral exam is also negative. His upper lip has a tendency to roll in on the right.     A nasal endoscopy was carried out. Under topical Xylocaine and Major-Synephrine the scope was introduced through both nostrils. On the right side of the nose he does have this a cavity that is slightly crusted. There is no evidence of any tumor recurrence.

## 2024-01-09 ENCOUNTER — OFFICE VISIT (OUTPATIENT)
Dept: OTOLARYNGOLOGY | Facility: CLINIC | Age: 64
End: 2024-01-09
Payer: COMMERCIAL

## 2024-01-09 VITALS — BODY MASS INDEX: 28.04 KG/M2 | HEIGHT: 72 IN | WEIGHT: 207 LBS

## 2024-01-09 DIAGNOSIS — C44.320 SQUAMOUS CELL CARCINOMA, FACE: Primary | ICD-10-CM

## 2024-01-09 DIAGNOSIS — C44.329 SQUAMOUS CELL CANCER OF SKIN OF RIGHT CHEEK: ICD-10-CM

## 2024-01-09 PROCEDURE — 99213 OFFICE O/P EST LOW 20 MIN: CPT | Performed by: OTOLARYNGOLOGY

## 2024-01-09 PROCEDURE — 1036F TOBACCO NON-USER: CPT | Performed by: OTOLARYNGOLOGY

## 2024-01-09 PROCEDURE — 31231 NASAL ENDOSCOPY DX: CPT | Performed by: OTOLARYNGOLOGY

## 2024-04-03 DIAGNOSIS — F32.0 MAJOR DEPRESSIVE DISORDER, SINGLE EPISODE, MILD (CMS-HCC): ICD-10-CM

## 2024-04-03 RX ORDER — BUPROPION HYDROCHLORIDE 150 MG/1
150 TABLET, EXTENDED RELEASE ORAL EVERY MORNING
Qty: 30 TABLET | Refills: 0 | Status: SHIPPED | OUTPATIENT
Start: 2024-04-03 | End: 2024-04-30

## 2024-04-16 DIAGNOSIS — C44.329 SQUAMOUS CELL CANCER OF SKIN OF RIGHT CHEEK: Primary | ICD-10-CM

## 2024-04-25 DIAGNOSIS — F32.0 MAJOR DEPRESSIVE DISORDER, SINGLE EPISODE, MILD (CMS-HCC): ICD-10-CM

## 2024-04-26 ASSESSMENT — ENCOUNTER SYMPTOMS
ABDOMINAL PAIN: 0
ARTHRALGIAS: 0
NUMBNESS: 0
FEVER: 0
NAUSEA: 0
HEADACHES: 0
SORE THROAT: 0
DIARRHEA: 0
LEG SWELLING: 0
COUGH: 0
SHORTNESS OF BREATH: 0
TROUBLE SWALLOWING: 0
VOMITING: 0
CHILLS: 0
LIGHT-HEADEDNESS: 0
CONSTIPATION: 0

## 2024-04-26 NOTE — PROGRESS NOTES
Patient ID: Juan Miguel Ballesteros is a 64 y.o. male.  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 Edward Pennington  [facility]: Dr. Kyunghee Burkitt  [facility]: Dr. Jose Alberto May    Prior Therapy  6/16 - 7/27/21: Received 2 cycles of HD Cisplatin (100mg/m2), 3rd  dose held d/t neutropenia.  Right sinus protons 50 gy in 25 fx. Boost protons 10 gy in 5 fx     Surgery  4/26/21: Lateral rhinotomy with excision of right facial soft tissue mass, partial maxillectomy. Right submandibular gland excision with neck exploration for vessels  12/27/21:  Right medial maxillectomy, left nasal endoscopy with lysis of adhesion,  right inferior turbinate resection, left inferior turbinate reduction, outfracture.   2/20/22: Fat grafting to the right midface and periorbita  6/23/22: Bilateral Arvizu tube placement    Current Oncologic Issues  Epiphora  Facial Neuropathy    ONCOLOGIC HISTORY  -  8/2020:  he started feeling a lump near the medial aspect of his right orbital rim. This was painful and it was causing him excruciating discomfort on palpation.   - 10/1/2020: S/p resection of right lower eyelid lesion, pathology showed poorly differentiated SCC.    - 10/2/20 [facility] Tumor Board: Presented with Right orbital mass, enlarging and painful, underwent local resection at outside facility with findings stated to be poorly differentiated squamous cell carcinoma, presents for further treatment recommendations.  Rec:  Further treatment recommendations pending pathology review  - 11/6/2020: PET, MRI showed no lesions.  Primary lesion is not clear.   - 3/2021: Recurrent disease in right orbital rim  - 4/2/21: MRI face showed abnormal enhancing soft tissue mass in an infraorbital location along the right cheek measuring  approximately 18 mm x 10 mm in transaxial dimensions by 20 mm in craniocaudal dimension.   -  4/26/21: resection of a large subcutaneous  squamous cell carcinoma of the right suborbital region, pathology showed poorly differentiated SCC (1.7cm), was found to have this ill-defined lesion with positive perineural extension. The infraorbital nerve  was tracked back all the way into the foramen rotundum. The final margin on the nerve was negative. He was presented at our tumor board and it was felt that he should undergo a combination of radiation and chemotherapy.   - 5/21/21 [facility] tumor Board History of right orbital rim squamous cell carcinoma S/P resection (2020), with pain and numbness with recurrent mass, underwent surgical resection, findings c/w cutaneous squamous cell carcinoma, Stage: T3 N0 M0 Rec: Radiation  +/- chemotherapy  - 6/16 - 7/27/21 : Received 2 cycles of high dose cisplatin, 3rd dose not given due to neutropenia. Right sinus protons 50 gy in 25 fx. Boost protons 10  gy in 5 fx  - 10/28/21: MRI orbit showed CONNOR.   - 4/7/23 [facility]: Reviewed 3/30/23 MRI Face, demonstrates post op changes and artifact noted. Asymmetrical  enhancement of V2 on right side.  No cavernous sinus involvement noted. Nothing noted on physical exam. Rec: Watchful waiting, repeat imaging and follow patient symptoms.              Past Medical History:   Past Medical History:  04/05/2022: Chronic maxillary sinusitis      Comment:  Chronic maxillary sinusitis  06/16/2022: Localized swelling, mass and lump, head      Comment:  Facial swelling  No date: Personal history of other malignant neoplasm of skin      Comment:  History of other malignant neoplasm of skin  12/14/2021: Personal history of other specified conditions      Comment:  History of nasal congestion  11/29/2021: Sensorineural hearing loss, bilateral      Comment:  Sensorineural hearing loss (SNHL) of both ears  10/11/2022: Squamous cell carcinoma of skin of other parts of face      Comment:  Squamous cell carcinoma of skin of orbital rim  11/29/2021: Tinnitus, bilateral      Comment:  Tinnitus of both  ears  06/16/2022: Unspecified ectropion of unspecified eye, unspecified   eyelid      Comment:  Ectropion  08/18/2022: Unspecified epiphora, right side      Comment:  Epiphora of right side  04/11/2022: Unspecified epiphora, unspecified side      Comment:  Epiphora   Surgical History:    Past Surgical History:   Procedure Laterality Date    OTHER SURGICAL HISTORY  04/02/2021    Tonsillectomy      Family History:    Family History   Problem Relation Name Age of Onset    Uterine cancer Mother      Lymphoma Father       Family Oncology History:    Cancer-related family history includes Lymphoma in his father; Uterine cancer in his mother.  Social History:    Social History     Tobacco Use    Smoking status: Never    Smokeless tobacco: Never          Subjective   Chief Complaint: Squamous Cell Carcinoma of right orbit, follow up    HPI  Interval History  Juan Miguel Ballesteros is a 64 y.o. male with h/o right orbital SCC in 8/2020, s/p resection, but unfortunately recurred in 3/2021, s/p resection with findings of extensive perineural involvement. S/p concurrent chemoradiation  with HD Cisplatin x2, protons 50 gy in 25 fx,  boost protons 10 gy in 5 fx, completed 7/27/21.     Patient presents today for 2 year 9 months follow up, he reports the following:    Patient reports feeling at his baseline.   Still has right facial numbness/neuropathic pain that comes and goes. There is no pattern to the onset of these right facial pains. Right upper lip also feels numb. He was following supp onc for this. Felt very fatigued from gabapentin and pregabalin and discontinued. He feels he's getting used to the pain and is not interested in re-starting any medications for it at this time.  Appetite is good, drink a lot of Gatorade and water.  Picked up a part time job.   Otherwise physically he's feeling well, eating well and continues to go to the [employer] program for group workouts 2-3 times a week.   Following with dentist Q6 month.        ROS  Review of Systems   Constitutional:  Negative for chills and fever.   HENT:   Negative for hearing loss, lump/mass, mouth sores, nosebleeds, sore throat, tinnitus and trouble swallowing.         Right facial pain/neuropathy   Respiratory:  Negative for cough and shortness of breath.    Cardiovascular:  Negative for chest pain and leg swelling.   Gastrointestinal:  Negative for abdominal pain, constipation, diarrhea, nausea and vomiting.   Musculoskeletal:  Negative for arthralgias.   Skin:  Negative for rash.   Neurological:  Negative for headaches, light-headedness and numbness.       Allergies  No Known Allergies     Medications  Current Outpatient Medications   Medication Instructions    buPROPion SR (WELLBUTRIN SR) 150 mg, oral, Every morning    multivitamin tablet 1 tablet, oral, Daily    valACYclovir (Valtrex) 1 gram tablet TAKE TWO TABLETS BY MOUTH TWICE A DAY AT FIRST SYMPTOM ONSET          Objective   VS: There were no vitals taken for this visit.  Weight: Daily Weight  01/09/24 : 93.9 kg (207 lb)  10/31/23 : 92.1 kg (203 lb)  10/04/23 : 93.1 kg (205 lb 4 oz)  09/06/23 : 93.2 kg (205 lb 8 oz)  07/12/23 : 93 kg (205 lb 2 oz)  06/30/23 : 92.5 kg (204 lb)  06/29/23 : 92.7 kg (204 lb 5.9 oz)      Physical Exam  Constitutional:       Appearance: Normal appearance. He is well-developed.   HENT:      Head: Normocephalic and atraumatic.      Comments: S/p right lateral rhinotomy. Mild erythema in right cheek, area of hardness likely fat pad     Right Ear: External ear normal. No tenderness.      Left Ear: External ear normal. No tenderness.      Nose: Nose normal.      Mouth/Throat:      Mouth: No injury or oral lesions.      Tongue: No lesions.   Eyes:      Extraocular Movements: Extraocular movements intact.      Conjunctiva/sclera: Conjunctivae normal.      Pupils: Pupils are equal, round, and reactive to light.   Neck:      Thyroid: No thyroid mass.   Abdominal:      General: There is no abdominal  bruit.   Musculoskeletal:         General: Normal range of motion.      Cervical back: Normal range of motion and neck supple. No signs of trauma. Normal range of motion.   Lymphadenopathy:      Upper Body:      Right upper body: No axillary adenopathy.      Left upper body: No axillary adenopathy.   Skin:     General: Skin is warm and dry.      Comments: No new skin lesions   Neurological:      General: No focal deficit present.      Mental Status: He is alert and oriented to person, place, and time.      Gait: Gait is intact.   Psychiatric:         Mood and Affect: Mood and affect normal.         Behavior: Behavior is cooperative.       Diagnostic Results   Labs  Below labs are reviewed today.                             Images  10/30/24 MRI Brain MRI Face w/wo contrast  1. Stable posttherapy changes with associated abnormal enhancement extending to the skull base, no evidence of progressive disease.   2. No acute intracranial pathology or evidence of intracranial metastatic disease     Pathology      Assessment/Plan   ASSESSMENT  Juan Miguel Ballesteros is a 64 y.o. male with right orbital SCC in 8/2020, s/p resection, but unfortunately recurred in 3/2021,  s/p resection with findings of extensive perineural involvement. S/p concurrent chemoradiation with q3 week cisplatin (6/16-7/27/21).     # Recurrent right orbital SCC  - S/p repeat resection and concurrent CRT w/ Q3week HD Cisplatin x 2, last cycle held d/t neutropenia. Last RT 7/27/21.   - 10/28/21: MRI orbit showed CONNOR.  - 11/8/21: CT facial bone showed notable right sided sinus disease  (soft tissue opacification of the right maxillary sinus, extending anteromedially to the surgical defect/at the site of the excision of the previously seen mass in the right infraorbital region/cheek region and extending posteriorly into the retromaxillary  fat, sphenopalatine fossa).  - 11/29/21: Seen by audiologist, high frequency hearing loss, patient wants to defer using hearing  aids.  - 12/27/21 Right medial maxillectomy ; Left nasal endoscopy with lysis of adhesion; Right inferior turbinate  resection n4;  Left inferior turbinate reduction  - 6/29/23: Chronic right sided facial neuropathy in V2 distribution has worsened  with increasing episodes of pain daily. MRI Face on 4/1/23 notes thickening and enhancement of V2, unclear if this is disease recurrence vs post radiation changes, will monitor with MRI Face Q4 months (per Dr. Cohn). 5/23/23 biopsy of the right cheek  did not reveal malignant cells. Per Dr. Pennington,  the palpable mass could correspond to the fat graft.   - 10/31/23: Continue to have right facial neuropathy in V2 distribution. Pain comes and goes. Not on any medications for this. Per ENT, will refer to Dr. Lima. Pt has MRI Brain and MRI Face ordered by Dr. Cohn today. Will follow up on results.   - 5/1/24: Continue to have right facial neuropathy in V2 distribution. Pain comes and goes, he's becoming used to this pain and numbness, not interested in any medications at this time. Otherwise doing well from head & neck oncology perspective. Patient can concerns about his low lymphocyte count. Explained that he appeared to have mildly low lymphocytes since before starting chemoRT and this is likely his baseline. He has not had any opportunistic infections. Reassurance provided.      # Epiphora  - Right epiphora started Sept 2021, left epiphora started Jan 2022.  - 2/20/22: Fat Grafting to the right midface and periorbita. Pt reports did not have symptom improvement after this procedure  - 6/23/22: Bilateral probing with Arvizu tube placement. Right lower eyelid cicatricial ectropion repair  - Still has persistent epiphora of bilateral eyes, L>R. He's considering another procedure for the left eye to drain lacrimal duct in to back to throat. this will be scheduled on 8/10/23 with Dr. Ly.       # MDD  -  Since retiring at end of 2020, daily routine has been  disrupted. Reports feeling tired with low motivation to get out of bed, especially pronounced in the mornings. However once he's able to get up his energy is good.  - Still going to his [employer] classes and exercising 1-2 x/week. Recently completed a 5K run  - Established with onco-psych 5/27/22, not on antidepressants  - 2/13/23 Started on Wellbutrin XL 150mg   - Patient has a lot of anxiety and feels depressed over recent developments and feels he has failed as he is still not back to his pre-treatment baseline. Provided supportive listening.      # Xerostomia  - Dry mouth, drinks a lot of water. Food intake is normal/regular     # Health Maintenance  - D/t cancer diagnosis patient has not had regular follow up with PCP or dentist. Encouraged following up with these providers for routine health maintenance     # Calf pain: resolved  - Likely related to muscle strain from playing soft ball. Low suspicion for DVT as onset of pain follows activity. Resolved with rest.      PLAN  -- RTC in 6 months with labs prior (CBC, CMP) on 10/29/24, labs cbc, cmp, tsh/t4  -- 5/20/24 FUV w/ Onco-Psych  -- 5/28/24 FUV w/ Dr. Pennington  -- Follow up with Dentistry (rec 2-4x/year as patient had prior radiation to head and neck)

## 2024-04-30 RX ORDER — BUPROPION HYDROCHLORIDE 150 MG/1
150 TABLET, EXTENDED RELEASE ORAL EVERY MORNING
Qty: 90 TABLET | Refills: 1 | Status: SHIPPED | OUTPATIENT
Start: 2024-04-30 | End: 2024-10-27

## 2024-05-01 ENCOUNTER — OFFICE VISIT (OUTPATIENT)
Dept: HEMATOLOGY/ONCOLOGY | Facility: HOSPITAL | Age: 64
End: 2024-05-01
Payer: COMMERCIAL

## 2024-05-01 ENCOUNTER — LAB (OUTPATIENT)
Dept: LAB | Facility: HOSPITAL | Age: 64
End: 2024-05-01
Payer: COMMERCIAL

## 2024-05-01 VITALS
DIASTOLIC BLOOD PRESSURE: 69 MMHG | WEIGHT: 208.78 LBS | HEART RATE: 61 BPM | OXYGEN SATURATION: 100 % | BODY MASS INDEX: 28.32 KG/M2 | SYSTOLIC BLOOD PRESSURE: 123 MMHG | TEMPERATURE: 96.8 F | RESPIRATION RATE: 20 BRPM

## 2024-05-01 DIAGNOSIS — Z85.89 ENCOUNTER FOR FOLLOW-UP SURVEILLANCE OF HEAD AND NECK CANCER: ICD-10-CM

## 2024-05-01 DIAGNOSIS — C44.329 SQUAMOUS CELL CANCER OF SKIN OF RIGHT CHEEK: Primary | ICD-10-CM

## 2024-05-01 DIAGNOSIS — Z08 ENCOUNTER FOR FOLLOW-UP SURVEILLANCE OF HEAD AND NECK CANCER: ICD-10-CM

## 2024-05-01 DIAGNOSIS — G51.9 FACIAL NEUROPATHY: ICD-10-CM

## 2024-05-01 DIAGNOSIS — D72.819 LEUKOPENIA, UNSPECIFIED TYPE: ICD-10-CM
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
## 2024-05-01 DIAGNOSIS — C44.329 SQUAMOUS CELL CANCER OF SKIN OF RIGHT CHEEK: ICD-10-CM

## 2024-05-01 DIAGNOSIS — K11.7 XEROSTOMIA DUE TO RADIOTHERAPY: ICD-10-CM

## 2024-05-01 LAB
ALBUMIN SERPL BCP-MCNC: 4.2 G/DL (ref 3.4–5)
ALP SERPL-CCNC: 43 U/L (ref 33–136)
ALT SERPL W P-5'-P-CCNC: 27 U/L (ref 10–52)
ANION GAP SERPL CALC-SCNC: 12 MMOL/L (ref 10–20)
AST SERPL W P-5'-P-CCNC: 27 U/L (ref 9–39)
BASOPHILS # BLD AUTO: 0.03 X10*3/UL (ref 0–0.1)
BASOPHILS NFR BLD AUTO: 0.8 %
BILIRUB SERPL-MCNC: 1.1 MG/DL (ref 0–1.2)
BUN SERPL-MCNC: 16 MG/DL (ref 6–23)
CALCIUM SERPL-MCNC: 9.6 MG/DL (ref 8.6–10.6)
CHLORIDE SERPL-SCNC: 103 MMOL/L (ref 98–107)
CO2 SERPL-SCNC: 31 MMOL/L (ref 21–32)
CREAT SERPL-MCNC: 1.05 MG/DL (ref 0.5–1.3)
EGFRCR SERPLBLD CKD-EPI 2021: 80 ML/MIN/1.73M*2
EOSINOPHIL # BLD AUTO: 0.11 X10*3/UL (ref 0–0.7)
EOSINOPHIL NFR BLD AUTO: 3 %
ERYTHROCYTE [DISTWIDTH] IN BLOOD BY AUTOMATED COUNT: 12.8 % (ref 11.5–14.5)
GLUCOSE SERPL-MCNC: 100 MG/DL (ref 74–99)
HCT VFR BLD AUTO: 43 % (ref 41–52)
HGB BLD-MCNC: 14.3 G/DL (ref 13.5–17.5)
IMM GRANULOCYTES # BLD AUTO: 0 X10*3/UL (ref 0–0.7)
IMM GRANULOCYTES NFR BLD AUTO: 0 % (ref 0–0.9)
LYMPHOCYTES # BLD AUTO: 1.03 X10*3/UL (ref 1.2–4.8)
LYMPHOCYTES NFR BLD AUTO: 28.5 %
MCH RBC QN AUTO: 30 PG (ref 26–34)
MCHC RBC AUTO-ENTMCNC: 33.3 G/DL (ref 32–36)
MCV RBC AUTO: 90 FL (ref 80–100)
MONOCYTES # BLD AUTO: 0.52 X10*3/UL (ref 0.1–1)
MONOCYTES NFR BLD AUTO: 14.4 %
NEUTROPHILS # BLD AUTO: 1.92 X10*3/UL (ref 1.2–7.7)
NEUTROPHILS NFR BLD AUTO: 53.3 %
NRBC BLD-RTO: 0 /100 WBCS (ref 0–0)
PLATELET # BLD AUTO: 201 X10*3/UL (ref 150–450)
POTASSIUM SERPL-SCNC: 4.9 MMOL/L (ref 3.5–5.3)
PROT SERPL-MCNC: 7.5 G/DL (ref 6.4–8.2)
RBC # BLD AUTO: 4.76 X10*6/UL (ref 4.5–5.9)
SODIUM SERPL-SCNC: 141 MMOL/L (ref 136–145)
TSH SERPL-ACNC: 0.81 MIU/L (ref 0.44–3.98)
WBC # BLD AUTO: 3.6 X10*3/UL (ref 4.4–11.3)

## 2024-05-01 PROCEDURE — 36415 COLL VENOUS BLD VENIPUNCTURE: CPT

## 2024-05-01 PROCEDURE — 99215 OFFICE O/P EST HI 40 MIN: CPT | Performed by: STUDENT IN AN ORGANIZED HEALTH CARE EDUCATION/TRAINING PROGRAM

## 2024-05-01 PROCEDURE — 80053 COMPREHEN METABOLIC PANEL: CPT

## 2024-05-01 PROCEDURE — 85025 COMPLETE CBC W/AUTO DIFF WBC: CPT

## 2024-05-01 PROCEDURE — 84443 ASSAY THYROID STIM HORMONE: CPT

## 2024-05-01 PROCEDURE — 1036F TOBACCO NON-USER: CPT | Performed by: STUDENT IN AN ORGANIZED HEALTH CARE EDUCATION/TRAINING PROGRAM

## 2024-05-01 ASSESSMENT — PAIN SCALES - GENERAL: PAINLEVEL: 0-NO PAIN

## 2024-05-14 ENCOUNTER — APPOINTMENT (OUTPATIENT)
Dept: OTOLARYNGOLOGY | Facility: CLINIC | Age: 64
End: 2024-05-14
Payer: COMMERCIAL

## 2024-05-20 ENCOUNTER — TELEMEDICINE (OUTPATIENT)
Dept: BEHAVIORAL HEALTH | Facility: HOSPITAL | Age: 64
End: 2024-05-20
Payer: COMMERCIAL

## 2024-05-20 DIAGNOSIS — F41.1 GAD (GENERALIZED ANXIETY DISORDER): ICD-10-CM

## 2024-05-20 DIAGNOSIS — F32.0 MAJOR DEPRESSIVE DISORDER, SINGLE EPISODE, MILD (CMS-HCC): Primary | ICD-10-CM

## 2024-05-20 PROCEDURE — 1036F TOBACCO NON-USER: CPT | Performed by: PSYCHIATRY & NEUROLOGY

## 2024-05-20 PROCEDURE — 99215 OFFICE O/P EST HI 40 MIN: CPT | Performed by: PSYCHIATRY & NEUROLOGY

## 2024-05-21 NOTE — PROGRESS NOTES
"Patient  Juan Miguel Ballesteros is a 64 y.o. male, presented for No chief complaint on file.  .    Patient was seen via virtual visit:   An interactive audio and video telecommunication system which permits real time communications between the patient (at the originating site) and provider (at the distant site) was utilized to provide this telehealth service.   Verbal consent was requested and obtained from Juan Miguel Ballesteros on this date, 05/21/24 for a telehealth visit.   Patient was seen a little later per his request, around 3 pm. He was initially driving when we connected, so asked to park the car in a safe place and then reconnect.     History of presenting Illness:     Patient reports that \"I am just not feeling good.\" He had difficult elaborating. States that he has been more busy at work. Gets easily frustrated when clients take longer or are not ready. He reports that he has not had any desire to do anything. Even when he goes out with someone he feels like he does not have much to talk. \"I just feel sorry for myself ... I feel like I can be better.\" Has not been seeing his therapist. He is not able to elaborate on what \"better\" means or look like for him. 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 Also, reports that he did not think therapy was helping, \"we were just going over ABCD model\" and he did not think it was helpful lso he stopped going. Encouraged to resume therapy, discuss with therapist his feeling of being \"stuck\". Also, advised to explore what \"better\" means for him. He beleievs \"I just want things to be as they were.\" However, has difficulty elaborating.      Has been going to work regularly, Has had a few social engagement. Has some social supports. Denies persistent depressed mood. No crying episodes. No suicidal or homicidal ideas, intent or plans.      Discussed his medication regimen, he remains resistant to trying any " medication that can potentially have any sexual side effects.    Review of Systems  Anxiety: General Anxiety Disorder (SIMONA)SIMONA Behaviors: excessive anxiety/worry and irritability  Depression: guilt and interest  Delirium: negative  Psychosis: negative  Mara: negative  Safety Issues: none  Psychiatric ROS Comment: NOne    Past Psychiatric History:   Previous therapy: no  Previous psychiatric treatment and medication trials: yes - [facility]  Previous psychiatric hospitalizations: no  Previous diagnoses: no  Previous suicide attempts: no  History of violence: no  Depression screening was performed with standardized tool: Not Screened    Past Medical History  Past Medical History:   Diagnosis Date    Chronic maxillary sinusitis 04/05/2022    Chronic maxillary sinusitis    Localized swelling, mass and lump, head 06/16/2022    Facial swelling    Personal history of other malignant neoplasm of skin     History of other malignant neoplasm of skin    Personal history of other specified conditions 12/14/2021    History of nasal congestion    Sensorineural hearing loss, bilateral 11/29/2021    Sensorineural hearing loss (SNHL) of both ears    Squamous cell carcinoma of skin of other parts of face 10/11/2022    Squamous cell carcinoma of skin of orbital rim    Tinnitus, bilateral 11/29/2021    Tinnitus of both ears    Unspecified ectropion of unspecified eye, unspecified eyelid 06/16/2022    Ectropion    Unspecified epiphora, right side 08/18/2022    Epiphora of right side    Unspecified epiphora, unspecified side 04/11/2022    Epiphora       Surgical History  Past Surgical History:   Procedure Laterality Date    OTHER SURGICAL HISTORY  04/02/2021    Tonsillectomy        Family History:  Family History   Problem Relation Name Age of Onset    Uterine cancer Mother      Lymphoma Father         Social History:  Social History     Socioeconomic History    Marital status:      Spouse name: Not on file    Number of  "children: Not on file    Years of education: Not on file    Highest education level: Not on file   Occupational History    Not on file   Tobacco Use    Smoking status: Never    Smokeless tobacco: Never   Substance and Sexual Activity    Alcohol use: Not on file    Drug use: Not on file    Sexual activity: Not on file   Other Topics Concern    Not on file   Social History Narrative    Not on file     Social Determinants of Health     Financial Resource Strain: Not on file   Food Insecurity: Not on file   Transportation Needs: Not on file   Physical Activity: Not on file   Stress: Not on file   Social Connections: Not on file   Intimate Partner Violence: Not on file   Housing Stability: Not on file            Scales:       Medication:  Current Outpatient Medications   Medication Instructions    buPROPion SR (WELLBUTRIN SR) 150 mg, oral, Every morning    multivitamin tablet 1 tablet, oral, Daily    valACYclovir (Valtrex) 1 gram tablet TAKE TWO TABLETS BY MOUTH TWICE A DAY AT FIRST SYMPTOM ONSET        Allergies  .No Known Allergies     Vitals:  Visit Vitals  Smoking Status Never        Mental Status Exam  General: Appears stated age, dressed appropriately  Appearance: Fair hygiene and grooming.  Attitude: Pleasant  Behavior: Cooperative  Motor Activity: WNL  Speech: Soft, normal rate, rhythm and volume.  Mood: \"overwhelmed.\"  Affect: Congruent  Thought Process: Linear and goal directed  Thought Content: Denies suicidal or homicidal ideas, intent or plans. No IOR or delusions.  Thought Perception: No perceptual abnormalities.  Cognition: Grossly intact  Insight: Intact  Judgement: Intact      Psychiatric Risk Assessment  Violence Risk Assessment: none  Acute Risk of Harm to Others is Considered: low   Suicide Risk Assessment: none  Protective Factors against Suicide: none  Acute Risk of Harm to Self is Considered: low    Labs:  Component      Latest Ref Rng 5/1/2024   LEUKOCYTES (10*3/UL) IN BLOOD BY AUTOMATED COUNT, " [place]      4.4 - 11.3 x10*3/uL 3.6 (L)    nRBC      0.0 - 0.0 /100 WBCs 0.0    ERYTHROCYTES (10*6/UL) IN BLOOD BY AUTOMATED COUNT, [place]      4.50 - 5.90 x10*6/uL 4.76    HEMOGLOBIN      13.5 - 17.5 g/dL 14.3    HEMATOCRIT      41.0 - 52.0 % 43.0    MCV      80 - 100 fL 90    MCH      26.0 - 34.0 pg 30.0    MCHC      32.0 - 36.0 g/dL 33.3    RED CELL DISTRIBUTION WIDTH      11.5 - 14.5 % 12.8    PLATELETS (10*3/UL) IN BLOOD AUTOMATED COUNT, [place]      150 - 450 x10*3/uL 201    NEUTROPHILS/100 LEUKOCYTES IN BLOOD BY AUTOMATED COUNT, [place]      40.0 - 80.0 % 53.3    Immature Granulocytes %, Automated      0.0 - 0.9 % 0.0    Lymphocytes %      13.0 - 44.0 % 28.5    Monocytes %      2.0 - 10.0 % 14.4    Eosinophils %      0.0 - 6.0 % 3.0    Basophils %      0.0 - 2.0 % 0.8    NEUTROPHILS (10*3/UL) IN BLOOD BY AUTOMATED COUNT, [place]      1.20 - 7.70 x10*3/uL 1.92    Immature Granulocytes Absolute, Automated      0.00 - 0.70 x10*3/uL 0.00    Lymphocytes Absolute      1.20 - 4.80 x10*3/uL 1.03 (L)    Monocytes Absolute      0.10 - 1.00 x10*3/uL 0.52    Eosinophils Absolute      0.00 - 0.70 x10*3/uL 0.11    Basophils Absolute      0.00 - 0.10 x10*3/uL 0.03    GLUCOSE      74 - 99 mg/dL 100 (H)    SODIUM      136 - 145 mmol/L 141    POTASSIUM      3.5 - 5.3 mmol/L 4.9    CHLORIDE      98 - 107 mmol/L 103    Bicarbonate      21 - 32 mmol/L 31    Anion Gap      10 - 20 mmol/L 12    Blood Urea Nitrogen      6 - 23 mg/dL 16    Creatinine      0.50 - 1.30 mg/dL 1.05    EGFR      >60 mL/min/1.73m*2 80    Calcium      8.6 - 10.6 mg/dL 9.6    Albumin      3.4 - 5.0 g/dL 4.2    Alkaline Phosphatase      33 - 136 U/L 43    Total Protein      6.4 - 8.2 g/dL 7.5    AST      9 - 39 U/L 27    Bilirubin Total      0.0 - 1.2 mg/dL 1.1    ALT      10 - 52 U/L 27    Thyroid Stimulating Hormone      0.44 - 3.98 mIU/L 0.81       Legend:  (L) Low  (H) High    Diagnosis:  Problem List Items Addressed This Visit    None        Assessment/Plan   Problem List Items Addressed This Visit    None      63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 Depression appears to be multifactorial, as his cancer recurred when he decided to take skilled nursing and has also been struggling financially. He was seen for a few months in 2022, was not amenable to antidepressant medication at the time and worked on CBT with some imprvement. HE was started on wellbutrin in 2023. He returns to clinic after 7-8 months. He is not engaged in therapy anymore. Continues to have anxiety and depressive symptoms, without significant functional impairment.      Discussed r/b/a of medication in detail, answered all his questions to his satisfaction.      Plan:  1. Major depressive disorder, recrrent, moderate; SIMONA  -Continue bupropion SR 150mg PO QAM  - Encouraged to resume individual therapy with psychology  -Provided supportive therapy focused on coping.  -No acute safety issues.    Medication Consent  Medication Consent: no medication changes necessary for review    Please schedule a follow-up with your PCP for your ongoing medical problems.    Dr. Becker is in office on Mon- Thu. Phone calls may not be returned until next day I am in office.   For scheduling questions: [phone]  For other questions: [phone]       For [facility], "[employer] is a 24/7 hotline that you can call for assistance: [phone].     Please call 9-1-1 or go to the nearest emergency room if you feel worse or have thoughts of hurting yourself or anyone else, or hearing voices, seeing visions or having new or scary thoughts about people around you.    I spent 40 minutes in the professional and overall care of this patient.    Ricardo Becker MD

## 2024-05-27 NOTE — PROGRESS NOTES
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 The tumor tracted along the infraorbital nerve all the way to the foramen rotundum. The last margin was clear. There was significant perineural invasion. He completed a combination of chemotherapy and radiation.      Hearing loss for which a hearing test will be obtained.     I will see him in 4 months.      Chief Complaint     Follow-up regarding the management of a squamous cell carcinoma of the soft tissue around the right orbit      History of Present Illness    This gentleman was seen at the request of a local colleague. In August 2020 he started feeling a lump near the medial aspect of his right orbital rim. This was painful and it was causing his excruciating discomfort on palpation. This was removed and surprisingly showed up as a poorly differentiated squamous cell carcinoma. He was presented at our tumor board and the origin of this lesion was not explained. I did send him for a PET scan as well as an MRI of the area. I personally reviewed both studies that were done in November 2020 and they were also read by the radiologist and no lesion could be identified. I saw him in late March 2021 with an obvious clinical recurrence. On April 26, 2021 he was brought to surgery. He was found to have this ill-defined lesion that extended in every direction and also had significant perineural extension. The infraorbital nerve was tracked back all the way into the pterygomaxillary fossa. The final margin on the nerve was negative. He was presented at our tumor board and it was felt that he should undergo a combination of radiation and chemotherapy. This was completed at the end of July 2021. He had an MRI done in October 2022 which was negative for anything worrisome. He had a TSH level in April 2023 which was normal. His last chest x-ray was in September 2023 and was normal.  He continues to  have some discomfort on the right side of his face. It seems to be more common than previously. He had a CT scan in May 2023 that shows some scarlike changes around that area.  He had an MRI in October 2023.  I personally reviewed it and it was negative.    Physical Exam    Examination of the face and neck does not show any palpable masses. There is good eye mobility. The anterior nasal exam shows a little bit of crusting but very minimal. His right nostril is definitely smaller than the left. Palpation of the parotid, neck, thyroid feel fails to show any worrisome masses or adenopathies. The ear examination is also negative. The intraoral exam is also negative. His upper lip has a tendency to roll in on the right.     A nasal endoscopy was carried out. Under topical Xylocaine and Major-Synephrine the scope was introduced through both nostrils. On the right side of the nose he does have this a cavity that is slightly crusted. There is no evidence of any tumor recurrence.

## 2024-05-28 ENCOUNTER — OFFICE VISIT (OUTPATIENT)
Dept: OTOLARYNGOLOGY | Facility: CLINIC | Age: 64
End: 2024-05-28
Payer: COMMERCIAL

## 2024-05-28 VITALS — WEIGHT: 209 LBS | HEIGHT: 72 IN | BODY MASS INDEX: 28.31 KG/M2

## 2024-05-28 DIAGNOSIS — C44.320 SQUAMOUS CELL CARCINOMA, FACE: ICD-10-CM

## 2024-05-28 DIAGNOSIS — C44.329 SQUAMOUS CELL CANCER OF SKIN OF RIGHT CHEEK: Primary | ICD-10-CM

## 2024-05-28 PROCEDURE — 31231 NASAL ENDOSCOPY DX: CPT | Performed by: OTOLARYNGOLOGY

## 2024-05-28 PROCEDURE — 99213 OFFICE O/P EST LOW 20 MIN: CPT | Performed by: OTOLARYNGOLOGY

## 2024-05-28 PROCEDURE — 1036F TOBACCO NON-USER: CPT | Performed by: OTOLARYNGOLOGY

## 2024-06-17 ENCOUNTER — APPOINTMENT (OUTPATIENT)
Dept: INTEGRATIVE MEDICINE | Facility: CLINIC | Age: 64
End: 2024-06-17
Payer: COMMERCIAL

## 2024-06-19 ENCOUNTER — TELEPHONE (OUTPATIENT)
Dept: PAIN MEDICINE | Facility: CLINIC | Age: 64
End: 2024-06-19

## 2024-06-19 ENCOUNTER — APPOINTMENT (OUTPATIENT)
Dept: INTEGRATIVE MEDICINE | Facility: CLINIC | Age: 64
End: 2024-06-19
Payer: COMMERCIAL

## 2024-06-19 DIAGNOSIS — C44.320 SQUAMOUS CELL CARCINOMA, FACE: Primary | ICD-10-CM

## 2024-06-19 DIAGNOSIS — K11.7 XEROSTOMIA: ICD-10-CM

## 2024-06-19 PROCEDURE — 99205 OFFICE O/P NEW HI 60 MIN: CPT | Performed by: HOSPITALIST

## 2024-06-19 NOTE — PROGRESS NOTES
Patient ID: Juan Miguel Ballesteros is a 64 y.o. male.  Referring Physician: No referring provider defined for this encounter.  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 Edward Pennington  MedOnc: Dr. Kyunghee Burkitt  [facility]: Dr. Jose Alberto May     Prior Therapy  6/16 - 7/27/21: Received 2 cycles of HD Cisplatin (100mg/m2), 3rd  dose held d/t neutropenia.  Right sinus protons 50 gy in 25 fx. Boost protons 10 gy in 5 fx      Surgery  4/26/21: Lateral rhinotomy with excision of right facial soft tissue mass, partial maxillectomy. Right submandibular gland excision with neck exploration for vessels  12/27/21:  Right medial maxillectomy, left nasal endoscopy with lysis of adhesion,  right inferior turbinate resection, left inferior turbinate reduction, outfracture.   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 Dow (Psych) - takes welbutrin.  Likes to go to the gym and talk to people - even if he's tired    Natural Products:    mvi    ROS:  no ha, visual symptoms, hearing loss  no sob, chest pain, palp  ROS o/w non contributory, please see HPI    Objective    BSA: There is no height or weight on file to calculate BSA.  There were  no vitals taken for this visit.    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 Edward Weinsteinc: Dr. Kyunghee Burkitt  RadOnc: Dr. Jose Alberto May     Prior Therapy  6/16 - 7/27/21: Received 2 cycles of HD Cisplatin (100mg/m2), 3rd  dose held d/t neutropenia.  Right sinus protons 50 gy in 25 fx. Boost protons 10 gy in 5 fx      Surgery  4/26/21: Lateral rhinotomy with excision of right facial soft tissue mass, partial maxillectomy. Right submandibular gland excision with neck exploration for vessels  12/27/21:  Right medial maxillectomy, left nasal endoscopy with lysis of adhesion,  right inferior turbinate resection, left inferior turbinate reduction, outfracture.   2/20/22: Fat grafting to the right midface and periorbita  6/23/22: Bilateral Arvizu tube placement      CANCER SPECIFIC RECCS:      LIFESTYLE:  Diet - 5-9 fruits/veg/day (7 veg to 2 fruits)  Cruciferous Vegetables - Brussel Sprouts, Kale, Broccoli, Cauliflower  Limit sugar  Plant based anti-inflammatory whole foods diet  AICR New American Plate  PHYSICAL ACTIVITY 30 MIN/DAY    Reading:  Anticancer Living  Cancer Fighting Kitchen    Websites:  Cook for your Life  Cancerchoices.org    SYMPTOM MANAGEMENT:  Neuropathy:  Integrative Modalities to consider:  Acupuncture weekly in [facility] weekly x 6 weeks then reassess  [facility] referral    Consider yoga    Anxiety and Depression with Sleep issues:  Continue Psych follow  ups  Consider Support Groups - going  Spiritual Care/Counseling    Integrative Modalities to consider:  Massage Therapy  Acupuncture  Reiki  Music Therapy (including HEALTH Tracks), expressive arts  Journaling    Natural Products to Consider:  Ashwaghanda (Rox) or PROZE NODZZ    Continue Prayer  Deep breathing: Deep abdominal breathing (5 min) in - the morning (in nose (4 counts), hold for 7, out through mouth (8 counts)  Music with Meditation    Yoga  walking, gardening is great  Focusing gratitude  [facility] - Guided Meditation  Consider NatureRx in future    Essential oils: Consider Lavender in a diffuser    Follow up: 3 months    Thank you for consulting me in for this patient  Zach Guy MD

## 2024-06-21 RX ORDER — PREGABALIN 50 MG/1
CAPSULE ORAL
COMMUNITY
Start: 2023-06-16

## 2024-06-21 RX ORDER — IBUPROFEN 600 MG/1
TABLET ORAL EVERY 6 HOURS
COMMUNITY
Start: 2023-05-16

## 2024-06-21 RX ORDER — COLD-HOT PACK
EACH MISCELLANEOUS
COMMUNITY

## 2024-06-21 RX ORDER — OXCARBAZEPINE 300 MG/1
TABLET, FILM COATED ORAL
COMMUNITY

## 2024-06-21 RX ORDER — SILDENAFIL 50 MG/1
TABLET, FILM COATED ORAL
COMMUNITY
Start: 2024-06-14

## 2024-06-21 RX ORDER — METHYLPREDNISOLONE 4 MG/1
TABLET ORAL
COMMUNITY
Start: 2023-05-02

## 2024-06-21 RX ORDER — HALOBETASOL PROPIONATE 0.1 MG/G
LOTION TOPICAL
COMMUNITY

## 2024-07-06 ENCOUNTER — OFFICE VISIT (OUTPATIENT)
Dept: BEHAVIORAL HEALTH | Facility: CLINIC | Age: 64
End: 2024-07-06
Payer: COMMERCIAL

## 2024-07-06 DIAGNOSIS — F32.0 CURRENT MILD EPISODE OF MAJOR DEPRESSIVE DISORDER WITHOUT PRIOR EPISODE (CMS-HCC): ICD-10-CM

## 2024-07-06 PROCEDURE — 90837 PSYTX W PT 60 MINUTES: CPT | Performed by: PSYCHOLOGIST

## 2024-07-08 NOTE — PROGRESS NOTES
"2 PM [id number] Indiv Psych for MDD (60 MIN, 205-310).    In-personal.  CBT/Supportive Therapy. Not seen since December, 2023. Didn't think therapy was helping. Recently saw psychiatrist, Dr. Becker, recommended to re-initiate therapy. Having a difficult time. Was getting caught up in applying ABCD model. Discussed trying to be more aware of thoughts contributing to distress. Feeling isolated. Continues to be bothered by facial pain, spasms. Oncologist referred him to a specialist, will be starting acupuncture at [facility]. Trying to be hopeful this will help. Went to [employer] recently with family, felt on the outside, left after a few hours. Discussed importance of keeping his head in there, trying to \"engage\" situations. Next day, turned down invite for 4th of July, saw pictures later, some regrets about not going. Has lost practicing in social situations. His psychiatrist asked him what \"better\" looks like and I encouraged him to consider this more, move towards activities that improve quality of life. Still exercising 3-4 times per week at gym. Talking to couple he's met, man now has cancer and he's helping provide feedback about cancer tx from his experience. Cancer exercise class not running at this time. Finding he feels better when he exercises. Some days start tough, starts thinking negatively from start. Next: 2 weeks.    "

## 2024-07-17 ENCOUNTER — APPOINTMENT (OUTPATIENT)
Dept: INTEGRATIVE MEDICINE | Facility: CLINIC | Age: 64
End: 2024-07-17
Payer: COMMERCIAL

## 2024-07-17 DIAGNOSIS — C44.320 SQUAMOUS CELL CARCINOMA, FACE: Primary | ICD-10-CM

## 2024-07-17 DIAGNOSIS — M79.10 MYALGIA: ICD-10-CM

## 2024-07-17 DIAGNOSIS — K11.7 XEROSTOMIA: ICD-10-CM

## 2024-07-17 PROCEDURE — 97140 MANUAL THERAPY 1/> REGIONS: CPT | Performed by: HOSPITALIST

## 2024-07-17 ASSESSMENT — PAIN SCALES - GENERAL: PAINLEVEL_OUTOF10: 0 - NO PAIN

## 2024-07-17 NOTE — PROGRESS NOTES
Massage Therapy Visit:     Juan Miguel Ballesteros was referred by Dr. Guy.    Condition of Client Subjective :  Patient ID: Juan Miguel Ballesteros is a 64 y.o. male who presents for a 50 minute Law Therapy.  I did Law Lymph Drainage (MLD) on his face.  Patient is being treated for facial cancer.  Patient stated that it was on his right side.  He stated that the right side of his face feels numb.  I gave him self care tips. I explained that this is very light and gentle on the face.  I worked on his face, neck and shoulders.  He was supine.  I instructed the patient to let me know if he had any questions during the treatment.  This was his first law therapy.  Patient did notice an improvement with his facial discomfort.  He was able to relax.     Session Information  Visit Type: New patient  Description of present complaint: Muscle tension, Discomfort        Objective   Pre-treatment Assessment  Arrival Mode: Ambulatory  Pain Score: 6  Anxiety Level (0-10): 5  Stress Level (0-10): 2  Coping Level (0-10): 9  Depression Level (0-10): 3  Fatigue Level (0-10): 1  Nausea Level (0-10): 0  Wellbeing Level (0-10): 4    Physical Exam    Actions Assessment/Plan :  Provider reviewed plan for the massage session, precautions and contraindications. Patient/guardian/hospital staff has given consent to treat with full understanding of what to expect during the session. Before massage therapy began, provider explained to the patient to communicate at any time if the pressure was causing discomfort past their tolerance level. Patient agreed to advise therapist.    Massage Treatment  Patient Position: Table  Positioning Assistance: Did not need assistance, Pillow(s)/bolster under knees while supine  Massage Technique: Relaxation massage, Lymphatic drainiage  Pressure Scale: 1 - Light pressure, 2 - Mild pressure    Response:  Post-treatment Assessment  Patient Noted Improvement of the Following Symptoms: Muscle tension, ROM  0-10 (Numeric) Pain  Score: 0 - No pain  Anxiety Level (0-10): 0  Stress Level (0-10): 0  Coping Level (0-10): 10  Depression Level (0-10): 0  Fatigue Level (0-10): 0  Nausea Level (0-10): 0  Wellbeing Level (0-10): 0    Evaluation:    Patient will follow up as needed.

## 2024-07-18 ENCOUNTER — TELEMEDICINE (OUTPATIENT)
Dept: BEHAVIORAL HEALTH | Facility: HOSPITAL | Age: 64
End: 2024-07-18
Payer: COMMERCIAL

## 2024-07-18 DIAGNOSIS — F32.0 MAJOR DEPRESSIVE DISORDER, SINGLE EPISODE, MILD (CMS-HCC): ICD-10-CM

## 2024-07-18 DIAGNOSIS — F41.1 GAD (GENERALIZED ANXIETY DISORDER): ICD-10-CM

## 2024-07-18 NOTE — PROGRESS NOTES
Patient  Juan Miguel Ballesteros is a 64 y.o. male, presented for No chief complaint on file.  .    Patient was seen via virtual visit:   An interactive audio and video telecommunication system which permits real time communications between the patient (at the originating site) and provider (at the distant site) was utilized to provide this telehealth service.   Verbal consent was requested and obtained from Juan Miguel Ballesteros on this date, 07/18/24 for a telehealth visit.       History of presenting Illness:  Patient reports that he has been feeling more anxious and depressed. Reports that he has not been able to sleep well at night. Reports that he falls asleep easily for a couple of hours, wakes up and takes about 1/2 hour to fall back asleep. Reports that he feels that he is sleeping well in AM when his alarm gets off. Has signed up for [employer] for accupuncture, massage therapy.  Restarted seeing therapist, plan to see every other week. Has been going to work regularly, has been enjoying work.     Has had a few social engagement. Has some social supports. Denies persistent depressed mood. No crying episodes. No suicidal or homicidal ideas, intent or plans.                           Discussed his medication regimen, he remains resistant to trying any medication that can potentially have any sexual side effects.     Review of Systems  Anxiety: General Anxiety Disorder (SIMONA)SIMONA Behaviors: excessive anxiety/worry and irritability  Depression: guilt and interest  Delirium: negative  Psychosis: negative  Mara: negative  Safety Issues: none  Psychiatric ROS Comment: NOne     Past Psychiatric History:   Previous therapy: no  Previous psychiatric treatment and medication trials: yes - [facility]  Previous psychiatric hospitalizations: no  Previous diagnoses: no  Previous suicide attempts: no  History of violence: no  Depression screening was performed with standardized tool: Not Screened    Past Medical History  Past  Medical History:   Diagnosis Date    Chronic maxillary sinusitis 04/05/2022    Chronic maxillary sinusitis    Localized swelling, mass and lump, head 06/16/2022    Facial swelling    Personal history of other malignant neoplasm of skin     History of other malignant neoplasm of skin    Personal history of other specified conditions 12/14/2021    History of nasal congestion    Sensorineural hearing loss, bilateral 11/29/2021    Sensorineural hearing loss (SNHL) of both ears    Squamous cell carcinoma of skin of other parts of face 10/11/2022    Squamous cell carcinoma of skin of orbital rim    Tinnitus, bilateral 11/29/2021    Tinnitus of both ears    Unspecified ectropion of unspecified eye, unspecified eyelid 06/16/2022    Ectropion    Unspecified epiphora, right side 08/18/2022    Epiphora of right side    Unspecified epiphora, unspecified side 04/11/2022    Epiphora       Surgical History  Past Surgical History:   Procedure Laterality Date    OTHER SURGICAL HISTORY  04/02/2021    Tonsillectomy        Family History:  Family History   Problem Relation Name Age of Onset    Uterine cancer Mother      Lymphoma Father         Social History:  Social History     Socioeconomic History    Marital status:      Spouse name: Not on file    Number of children: Not on file    Years of education: Not on file    Highest education level: Not on file   Occupational History    Not on file   Tobacco Use    Smoking status: Never    Smokeless tobacco: Never   Substance and Sexual Activity    Alcohol use: Not on file    Drug use: Not on file    Sexual activity: Not on file   Other Topics Concern    Not on file   Social History Narrative    Not on file     Social Determinants of Health     Financial Resource Strain: Not on file   Food Insecurity: Not on file   Transportation Needs: Not on file   Physical Activity: Not on file   Stress: Not on file   Social Connections: Not on file   Intimate Partner Violence: Not on file  "  Housing Stability: Not on file          Medication:  Current Outpatient Medications   Medication Instructions    buPROPion SR (WELLBUTRIN SR) 150 mg, oral, Every morning    halobetasol propionate (Bryhali) 0.01 % lotion Bryhali ; 0 Lotion prn Quantity: 0 Refills: 0 Ordered: 8-Apr-2021 Gabriela Gillespie Generic Substitution Allowed    ibuprofen (IBU) 600 mg tablet oral, Every 6 hours    methylPREDNISolone (Medrol Dospak) 4 mg tablets oral    multivit,calc,min-FA-K1-lycop (One-A-Day Men's Complete) 240 mcg-30 mcg- 300 mcg tablet oral    multivit-min/folic/vit K/lycop (MEN'S MULTIVITAMIN ORAL) oral    multivitamin (MULTIPLE VITAMINS ORAL) oral    multivitamin tablet 1 tablet, oral, Daily    OXcarbazepine (Trileptal) 300 mg tablet oral    pregabalin (Lyrica) 50 mg capsule oral    sildenafil (Viagra) 50 mg tablet TAKE 1 TABLET BY MOUTH EVERY DAY AS NEEDED FOR 10 DAYS    valACYclovir (Valtrex) 1 gram tablet TAKE TWO TABLETS BY MOUTH TWICE A DAY AT FIRST SYMPTOM ONSET        Allergies  .No Known Allergies     Vitals:  Visit Vitals  Smoking Status Never        Mental Status Exam  General: Appears stated age, dressed appropriately  Appearance: Fair hygiene and grooming.  Attitude: Pleasant  Behavior: Cooperative  Motor Activity: WNL  Speech: Soft, normal rate, rhythm and volume.  Mood: \"overwhelmed.\"  Affect: Congruent  Thought Process: Linear and goal directed  Thought Content: Denies suicidal or homicidal ideas, intent or plans. No IOR or delusions.  Thought Perception: No perceptual abnormalities.  Cognition: Grossly intact  Insight: Intact  Judgement: Intact        Psychiatric Risk Assessment  Violence Risk Assessment: none  Acute Risk of Harm to Others is Considered: low   Suicide Risk Assessment: none  Protective Factors against Suicide: none  Acute Risk of Harm to Self is Considered: low    Labs:  Component      Latest Ref Rng 5/1/2024   LEUKOCYTES (10*3/UL) IN BLOOD BY AUTOMATED COUNT, HUSSAIN      4.4 - 11.3 " x10*3/uL 3.6 (L)    nRBC      0.0 - 0.0 /100 WBCs 0.0    ERYTHROCYTES (10*6/UL) IN BLOOD BY AUTOMATED COUNT, Amharic      4.50 - 5.90 x10*6/uL 4.76    HEMOGLOBIN      13.5 - 17.5 g/dL 14.3    HEMATOCRIT      41.0 - 52.0 % 43.0    MCV      80 - 100 fL 90    MCH      26.0 - 34.0 pg 30.0    MCHC      32.0 - 36.0 g/dL 33.3    RED CELL DISTRIBUTION WIDTH      11.5 - 14.5 % 12.8    PLATELETS (10*3/UL) IN BLOOD AUTOMATED COUNT, Amharic      150 - 450 x10*3/uL 201    NEUTROPHILS/100 LEUKOCYTES IN BLOOD BY AUTOMATED COUNT, Amharic      40.0 - 80.0 % 53.3    Immature Granulocytes %, Automated      0.0 - 0.9 % 0.0    Lymphocytes %      13.0 - 44.0 % 28.5    Monocytes %      2.0 - 10.0 % 14.4    Eosinophils %      0.0 - 6.0 % 3.0    Basophils %      0.0 - 2.0 % 0.8    NEUTROPHILS (10*3/UL) IN BLOOD BY AUTOMATED COUNT, Amharic      1.20 - 7.70 x10*3/uL 1.92    Immature Granulocytes Absolute, Automated      0.00 - 0.70 x10*3/uL 0.00    Lymphocytes Absolute      1.20 - 4.80 x10*3/uL 1.03 (L)    Monocytes Absolute      0.10 - 1.00 x10*3/uL 0.52    Eosinophils Absolute      0.00 - 0.70 x10*3/uL 0.11    Basophils Absolute      0.00 - 0.10 x10*3/uL 0.03    GLUCOSE      74 - 99 mg/dL 100 (H)    SODIUM      136 - 145 mmol/L 141    POTASSIUM      3.5 - 5.3 mmol/L 4.9    CHLORIDE      98 - 107 mmol/L 103    Bicarbonate      21 - 32 mmol/L 31    Anion Gap      10 - 20 mmol/L 12    Blood Urea Nitrogen      6 - 23 mg/dL 16    Creatinine      0.50 - 1.30 mg/dL 1.05    EGFR      >60 mL/min/1.73m*2 80    Calcium      8.6 - 10.6 mg/dL 9.6    Albumin      3.4 - 5.0 g/dL 4.2    Alkaline Phosphatase      33 - 136 U/L 43    Total Protein      6.4 - 8.2 g/dL 7.5    AST      9 - 39 U/L 27    Bilirubin Total      0.0 - 1.2 mg/dL 1.1    ALT      10 - 52 U/L 27    Thyroid Stimulating Hormone      0.44 - 3.98 mIU/L 0.81       Legend:  (L) Low  (H) High    Diagnosis:  Problem List Items Addressed This Visit          Mental Health    Major depressive  disorder, single episode, mild (CMS-HCC)    SIMONA (generalized anxiety disorder)        Assessment/Plan   Problem List Items Addressed This Visit          Mental Health    Major depressive disorder, single episode, mild (CMS-HCC)    SIMONA (generalized anxiety disorder)       64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 Depression appears to be multifactorial, as his cancer recurred when he decided to take prison and has also been struggling financially. He was seen for a few months in 2022, was not amenable to antidepressant medication at the time and worked on CBT with some imprvement. HE was started on wellbutrin in 2023. He returns to clinic after 7-8 months. He is not engaged in therapy anymore. Continues to have anxiety and depressive symptoms, without significant functional impairment.      Discussed r/b/a of medication in detail, answered all his questions to his satisfaction.      Plan:  1. Major depressive disorder, recrrent, moderate; SIMONA  - Continue bupropion SR 150mg PO QAM  - Encouraged to resume individual therapy with psychology  - Provided supportive therapy focused on coping.  - No acute safety issues.    Medication Consent  Medication Consent: risks, benefits, side effects reviewed for all ordered meds    Please schedule a follow-up with your PCP for your ongoing medical problems.    Dr. Becker is in office on Mon- Thu. Phone calls may not be returned until next day I am in office.   For scheduling questions: [phone]  For other questions: [phone]       For [facility] residents, [facility] is a 24/7 hotline that you can call for assistance: [phone].     Please call 9-1-1 or go to the nearest emergency room if you feel worse or have thoughts of hurting yourself or  anyone else, or hearing voices, seeing visions or having new or scary thoughts about people around you.    I spent 60 minutes in the professional and overall care of this patient.    Ricardo Becker MD

## 2024-07-19 ENCOUNTER — APPOINTMENT (OUTPATIENT)
Dept: BEHAVIORAL HEALTH | Facility: CLINIC | Age: 64
End: 2024-07-19
Payer: COMMERCIAL

## 2024-07-19 DIAGNOSIS — F32.0 CURRENT MILD EPISODE OF MAJOR DEPRESSIVE DISORDER WITHOUT PRIOR EPISODE (CMS-HCC): ICD-10-CM

## 2024-07-19 PROCEDURE — 90837 PSYTX W PT 60 MINUTES: CPT | Performed by: PSYCHOLOGIST

## 2024-07-19 NOTE — PROGRESS NOTES
"8 AM [address] Indiv Psych for MDD (60 MIN, [phone]).    In-personal.  CBT/Supportive Therapy. Managed work better last week, more aware of not working self up. Not sleeping well. Doctor talked to him about trying melatonin but he's hesitant to do this. Discussed difficulties with neighbor who makes going home uncomfortable. Neighbor drinks, has others over late. This, cancer, divorce, wonders \"why is this happening to me?\"Getting to gym, friends not there (one recent cancer tx/radiation). Invited to event from gym, will see other cancer survivors, cookout, thinking about not going but we discussed reasons to go. Examined some of his negative automatic thoughts/barriers. Started massage, acupuncture to start in September. Looking forward to [employer] upcoming seasons. Still getting tickets from elderly friends, may not be able to get after this year. Will continue to track difficult events. Next: 2 weeks.   "

## 2024-07-22 ENCOUNTER — APPOINTMENT (OUTPATIENT)
Dept: INTEGRATIVE MEDICINE | Facility: CLINIC | Age: 64
End: 2024-07-22
Payer: COMMERCIAL

## 2024-07-22 DIAGNOSIS — M79.10 MYALGIA: ICD-10-CM

## 2024-07-22 DIAGNOSIS — K11.7 XEROSTOMIA: ICD-10-CM

## 2024-07-22 DIAGNOSIS — C44.320 SQUAMOUS CELL CARCINOMA, FACE: Primary | ICD-10-CM

## 2024-07-22 PROCEDURE — 97140 MANUAL THERAPY 1/> REGIONS: CPT | Performed by: HOSPITALIST

## 2024-07-22 ASSESSMENT — PAIN SCALES - GENERAL: PAINLEVEL_OUTOF10: 0 - NO PAIN

## 2024-07-22 NOTE — PROGRESS NOTES
Massage Therapy Visit:     Juan Miguel Ballesteros was referred by Dr. Guy.    Condition of Client Subjective :  Patient ID: Juan Miguel Ballesteros is a 64 y.o. male who presents for a 40 minute Law Therapy.  Patient is being treated for facial cancer.  I focused on his face and neck.  Patient stated that his back had bothered him.  I worked on his back. Patient stated that he did the self care tips I taught him last week.  He stated that he does notice some discomfort on the right side of his face.  The self care tips have helped.       Session Information  Visit Type: Follow-up visit  Description of present complaint: Muscle tension, Discomfort        Objective   Pre-treatment Assessment  Arrival Mode: Ambulatory  Pain Score: 5 - Moderate pain  Anxiety Level (0-10): 1  Stress Level (0-10): 1  Coping Level (0-10): 10  Depression Level (0-10): 1  Fatigue Level (0-10): 0  Nausea Level (0-10): 0  Wellbeing Level (0-10): 2        Actions Assessment/Plan :  Provider reviewed plan for the massage session, precautions and contraindications. Patient/guardian/hospital staff has given consent to treat with full understanding of what to expect during the session. Before massage therapy began, provider explained to the patient to communicate at any time if the pressure was causing discomfort past their tolerance level. Patient agreed to advise therapist.    Massage Treatment  Patient Position: Table  Positioning Assistance: Did not need assistance  Massage Technique: Relaxation massage, Lymphatic drainiage  Pressure Scale: 1 - Light pressure, 2 - Mild pressure, 3 - Medium pressure    Response:  Post-treatment Assessment  Patient Noted Improvement of the Following Symptoms: Muscle tension  0-10 (Numeric) Pain Score: 0 - No pain  Anxiety Level (0-10): 0  Stress Level (0-10): 0  Coping Level (0-10): 10  Depression Level (0-10): 0  Fatigue Level (0-10): 0  Nausea Level (0-10): 0  Wellbeing Level (0-10): 0    Evaluation:    Patient will follow up as  needed.

## 2024-07-24 ENCOUNTER — ALLIED HEALTH (OUTPATIENT)
Dept: INTEGRATIVE MEDICINE | Facility: CLINIC | Age: 64
End: 2024-07-24
Payer: COMMERCIAL

## 2024-07-24 DIAGNOSIS — M79.10 MYALGIA: ICD-10-CM

## 2024-07-24 DIAGNOSIS — C44.320 SQUAMOUS CELL CARCINOMA, FACE: Primary | ICD-10-CM

## 2024-07-24 DIAGNOSIS — K11.7 XEROSTOMIA: ICD-10-CM

## 2024-07-24 PROCEDURE — 99214 OFFICE O/P EST MOD 30 MIN: CPT | Performed by: HOSPITALIST

## 2024-07-24 ASSESSMENT — PAIN SCALES - GENERAL: PAINLEVEL_OUTOF10: 0 - NO PAIN

## 2024-07-24 NOTE — PROGRESS NOTES
Patient ID: Juan Miguel Ballesteros is a 64 y.o. male.  Referring Physician: No referring provider defined for this encounter.  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 Edward Pennington  MedOnc: Dr. Kyunghee Burkitt  [facility]: Dr. Jose Alberto May     Prior Therapy  6/16 - 7/27/21: Received 2 cycles of HD Cisplatin (100mg/m2), 3rd  dose held d/t neutropenia.  Right sinus protons 50 gy in 25 fx. Boost protons 10 gy in 5 fx      Surgery  4/26/21: Lateral rhinotomy with excision of right facial soft tissue mass, partial maxillectomy. Right submandibular gland excision with neck exploration for vessels  12/27/21:  Right medial maxillectomy, left nasal endoscopy with lysis of adhesion,  right inferior turbinate resection, left inferior turbinate reduction, outfracture.   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 Martinez     No swallowing issues or swelling in neck     Diet: usually eats out     PA: works out at gym with group, running 5 k on saturday    Sleep: not well, struggles to stay asleep, often shaky and anxious, intermittent     Stress: part time job  Belongs to a cancer program support group of survivors at  center  Management - not doing well, Dr. Dow (Psych) - takes welbutrin.  Likes to go to the gym and talk to people - even if he's tired     Natural Products:    mvi     ROS:  no ha, visual symptoms, hearing loss  no sob, chest pain, palp  ROS o/w non contributory, please see HPI         Objective  BSA: There is no height or weight on file to calculate BSA.  There were no vitals taken for this visit.     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 Edward HayesOnc: Dr. Kyunghee Burkitt  RadOn: Dr. Jose Alberto May     Prior Therapy  6/16 - 7/27/21: Received 2 cycles of HD Cisplatin (100mg/m2), 3rd  dose held d/t neutropenia.  Right sinus protons 50 gy in 25 fx. Boost protons 10 gy in 5 fx      Surgery  4/26/21: Lateral rhinotomy with excision of right facial soft tissue mass, partial maxillectomy. Right submandibular gland excision with neck exploration for vessels  12/27/21:  Right medial maxillectomy, left nasal endoscopy with lysis of adhesion,  right inferior turbinate resection, left inferior turbinate reduction, outfracture.   2/20/22: Fat grafting to the right midface and periorbita  6/23/22: Bilateral Arvizu tube placement        CANCER SPECIFIC RECCS:        LIFESTYLE:  Diet - 5-9 fruits/veg/day (7 veg to 2 fruits)  Cruciferous Vegetables - Brussel Sprouts, Kale, Broccoli, Cauliflower  Limit sugar  Plant based anti-inflammatory whole foods diet  AICR New American Plate  PHYSICAL ACTIVITY 30 MIN/DAY     Reading:  An[employer]ancer Living  Cancer Fighting Kitchen     Websites:  Cook for your Life  Cancerchoices.org     SYMPTOM MANAGEMENT:  Neuropathy:  Integrative Modalities to consider:  Acupuncture weekly in Integrative Oncology Symptom Management Clinic weekly x 6 weeks then reassess  [facility] referral     Consider yoga     Anxiety and Depression with Sleep issues:  Continue Psych follow ups  Consider Support Groups - going  Spiritual Care/Counseling      Integrative Modalities to consider:  Massage Therapy  Acupuncture  Reiki  Music Therapy (including HEALTH Tracks), expressive arts  Journaling     Natural Products to Consider:  Ashwaghanda (Rox) or PROZE NODZZ     Continue Prayer  Deep breathing: Deep abdominal breathing (5 min) in - the morning (in nose (4 counts), hold for 7, out through mouth (8 counts)  Music with Meditation    Yoga  walking, gardening is great  Focusing gratitude  [facility] - Guided Meditation  Consider NatureRx in future     Essential oils: Consider Lavender in a diffuser     Follow up: 3 months    SYMPTOM MANAGEMENT:  Integrative Oncology Symptom Management:    The [facility] Integrative Oncology Symptom Management clinic offers multi-disciplinary supervised care of cancer patients using Integrative Modalities billed to insurance using NCCN and SIO/ASCO guideline-driven practices.  ESAS is obtained prior to and after each treatment by the practitioner    Symptoms Managed:  Neuropathy - in the face    Anxiety/depression - c/b epistaxis intermittently, ha's, but otherwise doing ok    Natural Products utilized:      Integrative Treatment: Acupuncture  Session #: 1  Frequency: weekly    Referrals:   Recommendations:    Follow Up:  Symptom Management: weekly  Integrative Oncology:     I have personally seen the patient and supervised the treatment by the integrative practitioner during this visit.  Pt had symptoms discussed and I was present for the patient's 60 minutes of direct patient care.     SYMPTOM MANAGEMENT:  Integrative Oncology Symptom Management:    The [facility] Integrative Oncology Symptom Management clinic offers multi-disciplinary supervised care of cancer patients using Integrative Modalities billed to insurance using NCCN and SIO/ASCO guideline-driven practices.  ESAS is obtained prior to and after each treatment by the practitioner    Symptoms Managed:  Neuropathy    Anxiety/depression    Natural  Products utilized:      Integrative Treatment: Acupuncture  Session #: 1  Frequency: weekly    Referrals:   Recommendations:    Follow Up:  Symptom Management: weekly  Integrative Oncology:     I have personally seen the patient and supervised the treatment by the integrative practitioner during this visit.  Pt had symptoms discussed and I was present for the patient's 60 minutes of direct patient care.

## 2024-07-24 NOTE — PROGRESS NOTES
Acupuncture Visit:     Subjective   Patient ID: Juan Miguel Ballesteros is a 64 y.o. male who presents for No chief complaint on file.  PT seeking support for spasms and feeling of swelling local to right side of face.  It is intermittent.  He notes feeling it a few times every hour.               Pre-treatment Assessment  Pain Score: 7  Anxiety Level (0-10): 1  Stress Level (0-10): 1  Coping Level (0-10): 9  Depression Level (0-10): 1  Fatigue Level (0-10): 1  Nausea Level (0-10): 0  Wellbeing Level (0-10): 7    Review of Systems         Provider reviewed plan for the acupuncture session, precautions and contraindications. Patient/guardian/hospital staff has given consent to treat with full understanding of what to expect during the session. Before acupuncture began, provider explained to the patient to communicate at any time if the procedure was causing discomfort past their tolerance level. Patient agreed to advise acupuncturist. The acupuncturist counseled the patient on the risks of acupuncture treatment including pain, infection, bleeding, and no relief of pain. The patient was positioned comfortably. There was no evidence of infection at the site of needle insertions.    Objective   Physical Exam         Treatment Plan  Treatment Goals: Pain management    Acupuncture Treatment  Patient Position: Supine on a table  Needle Guage: 40 guage /.16/ Red seirin  Body Points: With retention, Without retention  Body Points - Left: lr 3  Body Points - Bilateral: k 6, 27, cv 17, st 36  Body Points - Right: li 4, st 8, gb 2  Needle Count In: 12  Needle Count Out: 12  Needle Retention Time (min): 25 minutes  Total Face to Face Time (min): 25 minutes         Post-treatment Assessment  0-10 (Numeric) Pain Score: 0 - No pain  Anxiety Level (0-10): 0  Stress Level (0-10): 0  Coping Level (0-10): 10  Depression Level (0-10): 0  Fatigue Level (0-10): 0  Nausea Level (0-10): 0  Wellbeing Level (0-10): 10    Assessment/Plan

## 2024-07-26 ENCOUNTER — OFFICE VISIT (OUTPATIENT)
Dept: OTOLARYNGOLOGY | Facility: CLINIC | Age: 64
End: 2024-07-26
Payer: COMMERCIAL

## 2024-07-26 VITALS
TEMPERATURE: 96.9 F | HEIGHT: 72 IN | BODY MASS INDEX: 27.97 KG/M2 | SYSTOLIC BLOOD PRESSURE: 122 MMHG | DIASTOLIC BLOOD PRESSURE: 66 MMHG | WEIGHT: 206.5 LBS

## 2024-07-26 DIAGNOSIS — C44.320 SQUAMOUS CELL CARCINOMA, FACE: Primary | ICD-10-CM

## 2024-07-26 DIAGNOSIS — M79.2 NEURALGIA: ICD-10-CM

## 2024-07-26 DIAGNOSIS — R04.0 EPISTAXIS: ICD-10-CM

## 2024-07-26 PROCEDURE — 99214 OFFICE O/P EST MOD 30 MIN: CPT | Performed by: OTOLARYNGOLOGY

## 2024-07-26 PROCEDURE — 31231 NASAL ENDOSCOPY DX: CPT | Performed by: OTOLARYNGOLOGY

## 2024-07-26 PROCEDURE — 1036F TOBACCO NON-USER: CPT | Performed by: OTOLARYNGOLOGY

## 2024-07-26 PROCEDURE — 3008F BODY MASS INDEX DOCD: CPT | Performed by: OTOLARYNGOLOGY

## 2024-07-26 ASSESSMENT — PATIENT HEALTH QUESTIONNAIRE - PHQ9
SUM OF ALL RESPONSES TO PHQ9 QUESTIONS 1 AND 2: 0
2. FEELING DOWN, DEPRESSED OR HOPELESS: NOT AT ALL
1. LITTLE INTEREST OR PLEASURE IN DOING THINGS: NOT AT ALL

## 2024-07-26 NOTE — PROGRESS NOTES
HPI: Reports return of nosebleeds this week. Both sides. Controlled with in 20 minutes. Last nosebleed before this week was months prior.     Using saline spray twice a day, mornign and night.     Worsening right sided facial spasms. Starting acupuncture.     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 The tumor tracted along the infraorbital nerve all the way to the foramen rotundum. The last margin was clear.     Right orbital rim SCC had this resected by an outside provider in 9/2020.  At the time of resection, he also had a septoplasty and cryotherapy performed for rhinorrhea.     Recalls having multiple prior skin cancers on the face as well that was treated with liquid chemotherapy in the past and believes he may have had a prior skin lesion over his current cancer.        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)   · Ectropion (374.10) (H02.109)   · Encounter for monitoring ototoxic drug therapy (V58.83,V58.69) (Z51.81,Z79.899)   · Epiphora (375.20) (H04.209)   · Epiphora of left side (375.20) (H04.202)   · Epiphora of right side (375.20) (H04.201)   · Epistaxis (784.7) (R04.0)   · Eye symptoms (379.99) (H57.9)   · Face pain (784.0) (R51.9)   · Facial asymmetries (754.0) (Q67.0)   · Facial swelling (784.2) (R22.0)   · SIMONA (generalized anxiety disorder) (300.02) (F41.1)   · Hearing loss (389.9) (H91.90)   · Hearing loss, unspecified hearing loss type, unspecified laterality (389.9) (H91.90)   · History of nasal congestion  (V12.69) (Z87.898)   · Left shoulder pain (719.41) (M25.512)   · Major depressive disorder, single episode, mild (296.21) (F32.0)   · MDD (major depressive disorder) (296.20) (F32.9)   · Nasal congestion (478.19) (R09.81)   · NLDO, acquired (nasolacrimal duct obstruction) (375.56) (H04.559)   · Observation for suspected neoplasm (V71.1) (Z03.89)   · Oral motor dysfunction (787.21) (R13.11)   · Otalgia of both ears (388.70) (H92.03)   · Sensation of fullness in both ears (388.8) (H93.8X3)   · Sensorineural hearing loss (SNHL) of both ears (389.18) (H90.3)   · Sinusitis (473.9) (J32.9)   · Squamous cell cancer of skin of right cheek (173.32) (C44.329)   · Squamous cell carcinoma of skin of orbital rim (173.32) (C44.329)   · Tinnitus of both ears (388.30) (H93.13)   · Weakness of shoulder (719.61) (R29.898)   · Wound infection (958.3) (T14.8XXA,L08.9)     Past Medical History     · Chronic maxillary sinusitis (473.0) (J32.0)   · Ectropion (374.10) (H02.109)   · Epiphora (375.20) (H04.209)   · Epiphora of right side (375.20) (H04.201)   · Facial swelling (784.2) (R22.0)   · History of Basal cell carcinoma (BCC) of skin of other part of torso (V10.83) (Z85.828)   · History of nasal congestion (V12.69) (Z87.898)   · Resolved Date: 14 Dec 2021   · Sensorineural hearing loss (SNHL) of both ears (389.18) (H90.3)   · Squamous cell carcinoma of skin of orbital rim (173.32) (C44.329)   · Tinnitus of both ears (388.30) (H93.13)     Surgical History     · History of Tonsillectomy     Social History     · Never smoked tobacco (V49.89) (Z78.9)   · Occasional alcohol use     Allergies     · No Known Drug Allergies   Recorded By: Nilda Anders; 4/2/2021 9:23:23 AM     Current Meds     Medication Name Instruction   buPROPion HCl ER (SR) 150 MG Oral Tablet Extended Release 12 Hour TAKE 1 TABLET TWICE DAILY.   Mens Multivitamin TABS        Vitals  Vital Signs     Recorded: [id number] 10:03AM   Temperature 97.3 C   Height 6 ft     Weight 205 lb 6.4 oz   BMI Calculated 27.86 kg/m2   BSA Calculated 2.15   Tobacco Use b) No   PHQ-2  #1. Over the last 2 weeks have you felt down, depressed or hopeless?  (If yes, answer PHQ-9 below) No   PHQ-2  #2. Over the last 2 weeks have you felt little interest  or pleasure in doing things?  (If yes, answer PHQ-9 below) No   Falls Screening (Age 18+) a) No falls within the last year      Physical Exam  CONSTITUTIONAL: Vitals -reviewed from intake field, well developed, well nourished.   RESPIRATION: Breathing comfortably, no stridor.  CV: No clubbing/cyanosis/edema in hands.  EYES: EOM Intact, mild inflamed conjunctiva/sclera on right. depression/recession of the right inferior periorbital region much improved after fat grafting, epiphora remains, stent in place  NEURO: Alert and oriented times 3, Cranial nerves II-XII grossly intact  HEAD AND FACE: Post-radiation changes to right cheek extending to area around and beneath the right eye resolving. No masses or lesions, mild lateral right ectropion present, epiphora present, continued improvement in right orbicularis oris function, with continued development of nasolabial folds and creases with maximal effort however has approximately a 2 cm area of firmness in the lower aspect of the right cheek just lateral to the sulcus appears to have resolved more superiorly there is a linear area of fullness in the subcutaneous tissues that is nontender, this is consistent with either fat graft or pedicle from flap  SALIVARY GLANDS: Parotid and submandibular glands normal bilaterally.  EARS: Normal external ears, external auditory canals, and TMs to otoscopy, normal hearing to whispered voice.  NOSE: External nose midline, anterior rhinoscopy reveals less crusting on the right.   ORAL CAVITY/OROPHARYNX/LIPS: improved mucous membranes, normal floor of mouth/tongue/OP, no masses or lesions are noted.  PHARYNGEAL WALLS AND NASOPHARYNX: No masses noted. Mucosa appears clean  and moist  NECK/LYMPH: No LAD, no thyroid masses. Trachea palpably midline  SKIN: surgical incisions healing  PSYCH: Alert and oriented with appropriate mood and affect     improved CN 7 midface motion    Procedure:  Bilateral nasal cavities sprayed with afrin/lidocaine. Bilateral nasal cavities inspected with flexible scope without evidence of mass or lesions or source of bleeding.     Imp:     CONNOR.    Increase nasal saline spray daily. Home humidification especially at night.    Agree with trying acupuncture for continued right sided neuralgia. May re start introducing gabapentin if unsuccessful.     Will schedule repeat MRI.     Jairo Cortez DO      The above resident note has been reviewed and confirmed.  Patient was seen and examined with the resident today.  Documentation has been reviewed.

## 2024-07-31 ENCOUNTER — APPOINTMENT (OUTPATIENT)
Dept: INTEGRATIVE MEDICINE | Facility: CLINIC | Age: 64
End: 2024-07-31
Payer: COMMERCIAL

## 2024-07-31 DIAGNOSIS — M79.10 MYALGIA: ICD-10-CM

## 2024-07-31 DIAGNOSIS — K11.7 XEROSTOMIA: ICD-10-CM

## 2024-07-31 DIAGNOSIS — C44.320 SQUAMOUS CELL CARCINOMA, FACE: Primary | ICD-10-CM

## 2024-07-31 PROCEDURE — 97140 MANUAL THERAPY 1/> REGIONS: CPT | Performed by: HOSPITALIST

## 2024-07-31 ASSESSMENT — PAIN SCALES - GENERAL: PAINLEVEL_OUTOF10: 0 - NO PAIN

## 2024-07-31 NOTE — PROGRESS NOTES
Massage Therapy Visit:     Juan Miguel Ballesteros was referred by Dr. Guy.    Condition of Client Subjective :  Patient ID: Juan Miguel Ballesteros is a 64 y.o. male who presents for reason for a 40 minute Law Therapy.  Patient is being treated for facial cancer.  Patient stated that the techniques I showed him have help.  He brought in his facial roller.  I used it on his face.  He was pleased he has the tools to use to help when he has facial spasms.  I focused on his face, neck, shoulders and back.       Session Information  Visit Type: Follow-up visit  Description of present complaint: Muscle tension      Objective   Pre-treatment Assessment  Arrival Mode: Ambulatory  Pain Score: 7  Anxiety Level (0-10): 3  Stress Level (0-10): 3  Coping Level (0-10): 8  Depression Level (0-10): 1  Fatigue Level (0-10): 5  Nausea Level (0-10): 0  Wellbeing Level (0-10): 3        Actions Assessment/Plan :  Provider reviewed plan for the massage session, precautions and contraindications. Patient/guardian/hospital staff has given consent to treat with full understanding of what to expect during the session. Before massage therapy began, provider explained to the patient to communicate at any time if the pressure was causing discomfort past their tolerance level. Patient agreed to advise therapist.    Massage Treatment  Patient Position: Table  Positioning Assistance: Did not need assistance  Massage Technique: Relaxation massage, Lymphatic drainiage  Pressure Scale: 1 - Light pressure, 2 - Mild pressure, 3 - Medium pressure    Response:  Post-treatment Assessment  Patient Noted Improvement of the Following Symptoms: Muscle tension  0-10 (Numeric) Pain Score: 0 - No pain  Anxiety Level (0-10): 0  Stress Level (0-10): 0  Coping Level (0-10): 10  Depression Level (0-10): 0  Fatigue Level (0-10): 0  Nausea Level (0-10): 0  Wellbeing Level (0-10): 0    Evaluation:   Patient will follow up as needed.

## 2024-08-01 ENCOUNTER — TELEPHONE (OUTPATIENT)
Dept: OTOLARYNGOLOGY | Facility: HOSPITAL | Age: 64
End: 2024-08-01
Payer: COMMERCIAL

## 2024-08-01 DIAGNOSIS — R22.0 SWELLING, MASS, OR LUMP ON FACE: ICD-10-CM

## 2024-08-01 RX ORDER — DOXYCYCLINE 100 MG/1
100 CAPSULE ORAL 2 TIMES DAILY
Qty: 20 CAPSULE | Refills: 0 | Status: SHIPPED | OUTPATIENT
Start: 2024-08-01 | End: 2024-08-11

## 2024-08-01 NOTE — TELEPHONE ENCOUNTER
Received call from patient.  He states he is still have nose bleeds. Per Patient they are brown in color.  He also just found a lump above his eye.  Photos sent and shared with Dr. Cohn.   Per Dr. Cohn:  Humidifier in the room  Saline mist 4 times a day  Ayr gel on a w tip bilateral nares BID and time and patience for the nosebleeds.  As far as the lump, prescription submitted for doxycycline.  Patient instructed to utilize warm compresses as well as ibuprofen.  Patient will call me with an update after completion of antibiotic.

## 2024-08-02 ENCOUNTER — APPOINTMENT (OUTPATIENT)
Dept: BEHAVIORAL HEALTH | Facility: CLINIC | Age: 64
End: 2024-08-02
Payer: COMMERCIAL

## 2024-08-02 ENCOUNTER — APPOINTMENT (OUTPATIENT)
Dept: OTOLARYNGOLOGY | Facility: CLINIC | Age: 64
End: 2024-08-02
Payer: COMMERCIAL

## 2024-08-02 ENCOUNTER — ALLIED HEALTH (OUTPATIENT)
Dept: INTEGRATIVE MEDICINE | Facility: CLINIC | Age: 64
End: 2024-08-02

## 2024-08-02 DIAGNOSIS — C44.320 SQUAMOUS CELL CARCINOMA, FACE: ICD-10-CM

## 2024-08-02 DIAGNOSIS — R22.0 FACIAL SWELLING: Primary | ICD-10-CM

## 2024-08-02 DIAGNOSIS — F32.0 CURRENT MILD EPISODE OF MAJOR DEPRESSIVE DISORDER WITHOUT PRIOR EPISODE (CMS-HCC): ICD-10-CM

## 2024-08-02 PROCEDURE — 90837 PSYTX W PT 60 MINUTES: CPT | Performed by: PSYCHOLOGIST

## 2024-08-02 PROCEDURE — 97139 UNLISTED THERAPEUTIC PX: CPT | Performed by: ACUPUNCTURIST

## 2024-08-02 NOTE — PROGRESS NOTES
"Acupuncture Visit:     Subjective   Patient ID: Juan Miguel Ballesteros is a 64 y.o. male who presents for Facial Swelling and Spasms    Patient reports continued facial spasms several times an hour; he also noted a new \"bump\" over his R eyebrow, as well as frequent nosebleeds; he is on antibiotics in the event it is a sinus infection, as well as nasal spray; he is scheduled for an MRI on 08/22      Initial intake:    PT seeking support for spasms and feeling of swelling local to right side of face.  It is intermittent.  He notes feeling it a few times every hour.          Session Information  Is this acupuncture treatment being billed to the patient's insurance company: No  Visit Type: Follow-up visit  Medical History Reviewed: I have reviewed pertinent medical history in EHR, and no contraindications are present to provide treatment         Review of Systems         Provider reviewed plan for the acupuncture session, precautions and contraindications. Patient/guardian/hospital staff has given consent to treat with full understanding of what to expect during the session. Before acupuncture began, provider explained to the patient to communicate at any time if the procedure was causing discomfort past their tolerance level. Patient agreed to advise acupuncturist. The acupuncturist counseled the patient on the risks of acupuncture treatment including pain, infection, bleeding, and no relief of pain. The patient was positioned comfortably. There was no evidence of infection at the site of needle insertions.    Objective   Physical Exam              Acupuncture Treatment  Patient Position: Seated and reclining  Needle Guage: 40 guage /.16/ Red seirin, 36 guage /.20/ Blue seirin  Body Points - Bilateral: KD7, KD10, 4 Bullock, ST36, KM Immune x2, GB20  Needle Count In: 16  Needle Count Out: 16              Assessment/Plan       "

## 2024-08-04 NOTE — PROGRESS NOTES
"9 AM [id number] Indiv Psych for MDD (60 MIN, [phone]).    In-personal.  CBT/Supportive. Went to social function with friends from old cancer group. Uncomfortable but stuck it out. Not feeling well when there. Has been having increased nose bleeds and trying to get clarification for reasons. Also, a bump returned to his head, concerned about potential cancer recurrence. Hasn't been feeling good for awhile. Has MRI scheduled on Aug 22. Nose bleeds may be secondary to sinus issues, has not started 10 day course of antibiotics. Did go to social gathering last night, for sister's birthday, better time. Having daily headaches. Has started integrative care-has had 3 massage treatments and 2 acupuncture treatments. Continues to have \"facial episodes\", pain in face multiple times within an hour, frustrating. Discussed events from Wed, Co2 detector off at home. Couldn't identify problem, had fire department and gas company came out. Asks self \"why does this happen to me?\" Will trouble shoot this. Discussed some concerns with health, in past acknowledging \"cancer is death sentence\". Father had cancer, MATT  from breast cancer. Looking forward to [employer] and going to tail [employer] in [place]. Next: 2-3 weeks.   "

## 2024-08-07 ENCOUNTER — APPOINTMENT (OUTPATIENT)
Dept: INTEGRATIVE MEDICINE | Facility: CLINIC | Age: 64
End: 2024-08-07
Payer: COMMERCIAL

## 2024-08-07 DIAGNOSIS — M79.10 MYALGIA: Primary | ICD-10-CM

## 2024-08-07 DIAGNOSIS — R22.0 FACIAL SWELLING: ICD-10-CM

## 2024-08-07 DIAGNOSIS — K11.7 XEROSTOMIA: ICD-10-CM

## 2024-08-07 DIAGNOSIS — C44.320 SQUAMOUS CELL CARCINOMA, FACE: ICD-10-CM

## 2024-08-07 PROCEDURE — 97140 MANUAL THERAPY 1/> REGIONS: CPT | Performed by: HOSPITALIST

## 2024-08-07 ASSESSMENT — PAIN SCALES - GENERAL: PAINLEVEL_OUTOF10: 0 - NO PAIN

## 2024-08-07 NOTE — PROGRESS NOTES
Massage Therapy Visit:     Juan Miguel Ballesteros was referred by Dr. Guy.    Condition of Client Subjective :  Patient ID: Juan Miguel Ballesteros is a 64 y.o. male who presents for a 40 minute Law Therapy.  Patient is being treated for facial cancer.  I worked on his face, neck and shoulders.  Patient was supine during the treatment.  Patient stated that he has been doing the self care tips I showed him.  He stated that it is helping with his facial discomfort.  Patient has a tight neck and shoulders.  He did notice an improvement with his muscle discomfort.  He did notice an increase in ROM in his neck.     Session Information  Visit Type: Follow-up visit  Description of present complaint: Muscle tension, Discomfort, Range of motion (ROM)        Objective   Pre-treatment Assessment  Arrival Mode: Ambulatory  Pain Score: 5 - Moderate pain  Anxiety Level (0-10): 1  Stress Level (0-10): 2  Coping Level (0-10): 8  Depression Level (0-10): 1  Fatigue Level (0-10): 3  Nausea Level (0-10): 0  Wellbeing Level (0-10): 2        Actions Assessment/Plan :  Provider reviewed plan for the massage session, precautions and contraindications. Patient/guardian/hospital staff has given consent to treat with full understanding of what to expect during the session. Before massage therapy began, provider explained to the patient to communicate at any time if the pressure was causing discomfort past their tolerance level. Patient agreed to advise therapist.    Massage Treatment  Patient Position: Table  Positioning Assistance: Pillow(s)/bolster under knees while supine  Massage Technique: Relaxation massage, Lymphatic drainiage  Pressure Scale: 1 - Light pressure, 2 - Mild pressure, 3 - Medium pressure    Response:  Post-treatment Assessment  Patient Noted Improvement of the Following Symptoms: Muscle tension, ROM  0-10 (Numeric) Pain Score: 0 - No pain  Anxiety Level (0-10): 0  Stress Level (0-10): 0  Coping Level (0-10): 10  Depression Level (0-10):  0  Fatigue Level (0-10): 0  Nausea Level (0-10): 0  Wellbeing Level (0-10): 0    Evaluation:   Patient will follow up as needed.

## 2024-08-09 ENCOUNTER — APPOINTMENT (OUTPATIENT)
Dept: INTEGRATIVE MEDICINE | Facility: CLINIC | Age: 64
End: 2024-08-09
Payer: COMMERCIAL

## 2024-08-09 DIAGNOSIS — M79.10 MYALGIA: ICD-10-CM

## 2024-08-09 DIAGNOSIS — R22.0 FACIAL SWELLING: Primary | ICD-10-CM

## 2024-08-09 PROCEDURE — 97139 UNLISTED THERAPEUTIC PX: CPT | Performed by: ACUPUNCTURIST

## 2024-08-09 NOTE — PROGRESS NOTES
Acupuncture Visit:     Subjective   Patient ID: Juan Miguel Ballesteros is a 64 y.o. male who presents for No chief complaint on file.    Patient notes some pain in his face, especially after extended periods of talking      Initial intake:    PT seeking support for spasms and feeling of swelling local to right side of face.  It is intermittent.  He notes feeling it a few times every hour.          Session Information  Is this acupuncture treatment being billed to the patient's insurance company: No  Visit Type: Follow-up visit  Medical History Reviewed: I have reviewed pertinent medical history in EHR, and no contraindications are present to provide treatment         Review of Systems         Provider reviewed plan for the acupuncture session, precautions and contraindications. Patient/guardian/hospital staff has given consent to treat with full understanding of what to expect during the session. Before acupuncture began, provider explained to the patient to communicate at any time if the procedure was causing discomfort past their tolerance level. Patient agreed to advise acupuncturist. The acupuncturist counseled the patient on the risks of acupuncture treatment including pain, infection, bleeding, and no relief of pain. The patient was positioned comfortably. There was no evidence of infection at the site of needle insertions.    Objective   Physical Exam         Treatment Plan  Treatment Goals: Pain management, Wellbeing improvement    Acupuncture Treatment  Patient Position: Seated and reclining  Needle Guage: 40 guage /.16/ Red seirin, 36 guage /.20/ Blue seirin  Body Points - Bilateral: KD7, KD10, 4 Bullock, ST36  Body Points - Right: GB20, SJ17, GB2  Needle Count In: 13  Needle Count Out: 13              Assessment/Plan

## 2024-08-14 ENCOUNTER — APPOINTMENT (OUTPATIENT)
Dept: INTEGRATIVE MEDICINE | Facility: CLINIC | Age: 64
End: 2024-08-14
Payer: COMMERCIAL

## 2024-08-14 DIAGNOSIS — M79.10 MYALGIA: ICD-10-CM

## 2024-08-14 DIAGNOSIS — R22.0 FACIAL SWELLING: ICD-10-CM

## 2024-08-14 DIAGNOSIS — K11.7 XEROSTOMIA: Primary | ICD-10-CM

## 2024-08-14 PROCEDURE — 97140 MANUAL THERAPY 1/> REGIONS: CPT | Performed by: HOSPITALIST

## 2024-08-14 ASSESSMENT — PAIN SCALES - GENERAL: PAINLEVEL_OUTOF10: 0 - NO PAIN

## 2024-08-14 NOTE — PROGRESS NOTES
Massage Therapy Visit:     Juan Miguel Ballesteros was referred by Dr. Guy.    Condition of Client Subjective :  Patient ID: Juan Miguel Ballesteros is a 64 y.o. male who presents for a 40 minute Law Therapy.  Patient is being treated for facial cancer.  I did Law Lymph Drainage on his face. I worked very gently on his face.  I showed the patient self care tips. Patient carries his stress in his neck and shoulders.  I worked on him supine.  Patient did notice an improvement with his muscle discomfort.     Session Information  Visit Type: Follow-up visit  Description of present complaint: Muscle tension, Discomfort        Objective   Pre-treatment Assessment  Arrival Mode: Ambulatory  Pain Score: 7  Anxiety Level (0-10): 1  Stress Level (0-10): 1  Coping Level (0-10): 8  Depression Level (0-10): 0  Fatigue Level (0-10): 3  Nausea Level (0-10): 0  Wellbeing Level (0-10): 2        Actions Assessment/Plan :  Provider reviewed plan for the massage session, precautions and contraindications. Patient/guardian/hospital staff has given consent to treat with full understanding of what to expect during the session. Before massage therapy began, provider explained to the patient to communicate at any time if the pressure was causing discomfort past their tolerance level. Patient agreed to advise therapist.    Massage Treatment  Patient Position: Table  Positioning Assistance: Did not need assistance  Massage Technique: Therapeutic massage, Lymphatic drainiage  Pressure Scale: 1 - Light pressure, 2 - Mild pressure, 3 - Medium pressure    Response:  Post-treatment Assessment  Patient Noted Improvement of the Following Symptoms: Muscle tension  0-10 (Numeric) Pain Score: 0 - No pain  Anxiety Level (0-10): 0  Stress Level (0-10): 0  Coping Level (0-10): 10  Depression Level (0-10): 0  Fatigue Level (0-10): 0  Nausea Level (0-10): 0  Wellbeing Level (0-10): 0    Evaluation:   Patient will follow up as needed.

## 2024-08-16 ENCOUNTER — APPOINTMENT (OUTPATIENT)
Dept: INTEGRATIVE MEDICINE | Facility: CLINIC | Age: 64
End: 2024-08-16

## 2024-08-16 ENCOUNTER — APPOINTMENT (OUTPATIENT)
Dept: BEHAVIORAL HEALTH | Facility: CLINIC | Age: 64
End: 2024-08-16
Payer: COMMERCIAL

## 2024-08-16 DIAGNOSIS — F32.0 CURRENT MILD EPISODE OF MAJOR DEPRESSIVE DISORDER WITHOUT PRIOR EPISODE (CMS-HCC): ICD-10-CM

## 2024-08-16 PROCEDURE — 90837 PSYTX W PT 60 MINUTES: CPT | Performed by: PSYCHOLOGIST
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
## 2024-08-21 ENCOUNTER — APPOINTMENT (OUTPATIENT)
Dept: INTEGRATIVE MEDICINE | Facility: CLINIC | Age: 64
End: 2024-08-21
Payer: COMMERCIAL

## 2024-08-21 DIAGNOSIS — K11.7 XEROSTOMIA: ICD-10-CM

## 2024-08-21 DIAGNOSIS — C44.320 SQUAMOUS CELL CARCINOMA, FACE: Primary | ICD-10-CM

## 2024-08-21 DIAGNOSIS — R22.0 FACIAL SWELLING: ICD-10-CM

## 2024-08-21 DIAGNOSIS — M79.10 MYALGIA: ICD-10-CM

## 2024-08-21 PROCEDURE — 97140 MANUAL THERAPY 1/> REGIONS: CPT | Performed by: HOSPITALIST

## 2024-08-21 ASSESSMENT — PAIN SCALES - GENERAL: PAINLEVEL_OUTOF10: 0 - NO PAIN

## 2024-08-21 NOTE — PROGRESS NOTES
Massage Therapy Visit:     Juan Miguel Ballesteros was referred by Dr Guy.    Condition of Client Subjective :  Patient ID: Juan Miguel Ballesteros is a 64 y.o. male who presents for a 40 minute Law Therapy.  Patient is being treated for facial cancer.  I did Law Lymph Drainage (MLD) on his face.  Patient stated that he has discomfort is on the right side of his face under his eye.  I focused on that area. I have given the patient self care tips.  Patient stated that he has slight back discomfort.  Patient was pleased that he noticed an improvement with his muscle discomfort.     Session Information  Visit Type: Follow-up visit  Description of present complaint: Muscle tension, Discomfort  Since Last Visit, Patient Noted Improved: Muscle tension        Objective   Pre-treatment Assessment  Arrival Mode: Ambulatory  Pain Score: 6  Anxiety Level (0-10): 1  Stress Level (0-10): 1  Coping Level (0-10): 8  Depression Level (0-10): 1  Fatigue Level (0-10): 1  Nausea Level (0-10): 1  Wellbeing Level (0-10): 3        Actions Assessment/Plan :  Provider reviewed plan for the massage session, precautions and contraindications. Patient/guardian/hospital staff has given consent to treat with full understanding of what to expect during the session. Before massage therapy began, provider explained to the patient to communicate at any time if the pressure was causing discomfort past their tolerance level. Patient agreed to advise therapist.    Massage Treatment  Positioning Assistance: Did not need assistance  Massage Technique: Relaxation massage, Lymphatic drainiage  Pressure Scale: 1 - Light pressure, 2 - Mild pressure, 3 - Medium pressure    Response:  Post-treatment Assessment  Patient Noted Improvement of the Following Symptoms: Muscle tension  0-10 (Numeric) Pain Score: 0 - No pain  Anxiety Level (0-10): 0  Stress Level (0-10): 0  Coping Level (0-10): 10  Depression Level (0-10): 0  Fatigue Level (0-10): 0  Nausea Level (0-10):  0  Wellbeing Level (0-10): 0    Evaluation:   Patient will follow up as needed.

## 2024-08-22 ENCOUNTER — HOSPITAL ENCOUNTER (OUTPATIENT)
Dept: RADIOLOGY | Facility: HOSPITAL | Age: 64
Discharge: HOME | End: 2024-08-22
Payer: COMMERCIAL

## 2024-08-22 DIAGNOSIS — R51.9 HEADACHE, UNSPECIFIED: ICD-10-CM

## 2024-08-22 DIAGNOSIS — R22.0 LOCALIZED SWELLING, MASS AND LUMP, HEAD: ICD-10-CM

## 2024-08-22 DIAGNOSIS — C44.329 SQUAMOUS CELL CARCINOMA OF SKIN OF OTHER PARTS OF FACE: ICD-10-CM

## 2024-08-22 PROCEDURE — 70553 MRI BRAIN STEM W/O & W/DYE: CPT

## 2024-08-22 PROCEDURE — A9585 GADOBUTROL INJECTION: HCPCS | Performed by: OTOLARYNGOLOGY

## 2024-08-22 PROCEDURE — 2550000001 HC RX 255 CONTRASTS: Performed by: OTOLARYNGOLOGY

## 2024-08-22 PROCEDURE — 70543 MRI ORBT/FAC/NCK W/O &W/DYE: CPT

## 2024-08-22 RX ORDER — GADOBUTROL 604.72 MG/ML
10 INJECTION INTRAVENOUS ONCE
Status: COMPLETED | OUTPATIENT
Start: 2024-08-22 | End: 2024-08-22

## 2024-08-23 ENCOUNTER — APPOINTMENT (OUTPATIENT)
Dept: INTEGRATIVE MEDICINE | Facility: CLINIC | Age: 64
End: 2024-08-23
Payer: COMMERCIAL

## 2024-08-23 DIAGNOSIS — M79.10 MYALGIA: Primary | ICD-10-CM

## 2024-08-23 PROCEDURE — 97139 UNLISTED THERAPEUTIC PX: CPT | Performed by: ACUPUNCTURIST

## 2024-08-23 NOTE — PROGRESS NOTES
Acupuncture Visit:     Subjective   Patient ID: Juan Miguel Ballesteros is a 64 y.o. male who presents for No chief complaint on file.    Patient stated that his symptoms are baseline today      Initial intake:    PT seeking support for spasms and feeling of swelling local to right side of face.  It is intermittent.  He notes feeling it a few times every hour.          Session Information  Is this acupuncture treatment being billed to the patient's insurance company: No  Visit Type: Follow-up visit  Medical History Reviewed: I have reviewed pertinent medical history in EHR, and no contraindications are present to provide treatment         Review of Systems         Provider reviewed plan for the acupuncture session, precautions and contraindications. Patient/guardian/hospital staff has given consent to treat with full understanding of what to expect during the session. Before acupuncture began, provider explained to the patient to communicate at any time if the procedure was causing discomfort past their tolerance level. Patient agreed to advise acupuncturist. The acupuncturist counseled the patient on the risks of acupuncture treatment including pain, infection, bleeding, and no relief of pain. The patient was positioned comfortably. There was no evidence of infection at the site of needle insertions.    Objective   Physical Exam         Treatment Plan  Treatment Goals: Wellbeing improvement    Acupuncture Treatment  Patient Position: Seated and reclining  Needle Guage: 40 guage /.16/ Red seirin, 36 guage /.20/ Blue seirin  Body Points - Bilateral: KD7, KD10, 4 Bullock, ST36, (KM chin, YT, DU24)  Body Points - Right: GB41, GB34, GB20, SJ17, GB2  Needle Count In: 18  Needle Count Out: 18              Assessment/Plan

## 2024-08-27 ENCOUNTER — TELEPHONE (OUTPATIENT)
Dept: OTOLARYNGOLOGY | Facility: HOSPITAL | Age: 64
End: 2024-08-27
Payer: COMMERCIAL

## 2024-08-27 DIAGNOSIS — C44.320 SQUAMOUS CELL CARCINOMA, FACE: ICD-10-CM

## 2024-08-27 NOTE — TELEPHONE ENCOUNTER
Spoke with patient about the MRI results    Overall appears stable    There is some nodularity that he feels above his right eye where previously it was swollen and it did improve with antibiotics  If this does not go away in the next 2 weeks I will see him in the office  For an examination    We have him on tumor board for radiology review    I will call him after the other review has been conducted

## 2024-08-29 NOTE — TUMOR BOARD NOTE
[facility] HEAD AND NECK TUMOR BOARD NOTE:    Juan Miguel Ballesteros Is a 64 y.o. male who was presented by Dr. Cohn at [facility] Head & Neck Tumor Board on 8/30/24 which included representatives from all Head & Neck disciplines (Medical oncology/Radiation oncology/Otolaryngology/Radiology/Pathology).     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 Tumor noted to tract along infraorbital nerve to foramen rotundum. No evidence of recurrence when last seen in office 7/26/24 though worsening facial spasms and epistaxis.    Imaging:  MRI brain with and without contrast  IMPRESSION:  1. Soft tissue enhancement within the right pterygopalatine fossa may  be representative of post therapeutic granulation tissue or neoplasm.  Can not exclude subtle postcontrast enhancement of the right V2  segment of the trigeminal nerve within foramen rotundum as described  above which could be due to post treatment changes and/or artifact  although perineural spread is a consideration with relative stability  favoring the former. No pathologic enhancement or mass lesion in the  region of Meckel's cave. Advise further follow-up examination if  clinically indicated.  2. Minimal / subtle postcontrast nodular enhancement within the right  supraorbital soft tissues, too small to confidently characterize.      Procedures to date:  No recent biopsy.    Pertinent Pathology:  4/26/21: surgical pathology:    FINAL DIAGNOSIS  A.  RIGHT MAXILLA AND SOFT TISSUE, MAXILLECTOMY AND EXCISION OF SOFT TISSUE OF  FACE:  -- POORLY DIFFERENTIATED SQUAMOUS CELL CARCINOMA (1.7 CM), SEE NOTE.    NOTE: Sections show 1.7 cm lesion composed of sheets and cords of epithelioid  cells infiltrating fibroadipose tissue, skeletal muscle, bone, and abutting the  floor of the sinus. Appropriately controlled p40 and CK5/6 immunostains  highlight lesional cells and are  "supportive of the diagnosis. The lesion is  seen involving the superior anterior and superior posterior soft tissue margins  and the medial bony margin (toward orbital rim). Appropriately controlled p40  immunostain highlights lesional cells present at the bony margin. Lesion is  noted touching green ink, designated as inferior margin; deeper levels were  examined, and the finding is favored to represent grossing artefact and not a  true positive margin. Extensive perineural invasion is present, with largest  nerve measuring 1.0 mm in caliber. No lymphovascular invasion is seen.     B.  NERVE, RIGHT INFRAORBITAL, PROXIMAL MARGIN, EXCISION:    -- POORLY DIFFERENTIATED SQUAMOUS CELL CARCINOMA INVOLVING NERVE.    C.  SOFT TISSUE, MEDIAL MARGIN, EXCISION:  -- SKELETAL MUSCLE AND FIBROUS TISSUE, NEGATIVE FOR CARCINOMA.     D.  LYMPH NODE, RIGHT PERIFACIAL, EXCISION:  -- ONE LYMPH NODE, NEGATIVE FOR CARCINOMA (0/1).     E.  NERVE, RIGHT INFERIOR ORBITAL, FINAL MARGIN, EXCISION:    -- POORLY DIFFERENTIATED SQUAMOUS CELL CARCINOMA INVOLVING NERVE.  -- MEDIAL CALCIFIC SCLEROSIS.    F.  MAXILLARY SINUS MUCOSA, EXCISION:  -- RESPIRATORY MUCOSA WITH DYSTROPHIC CALCIFICATIONS, NEGATIVE FOR CARCINOMA.  -- FRAGMENTS OF VIABLE BONE, NEGATIVE FOR CARCINOMA.     G.  NERVE, RIGHT INFRA ORBITAL, EXCISION:    -- POORLY DIFFERENTIATED SQUAMOUS CELL CARCINOMA INVOLVING NERVE.    H.  SUBMANDIBULAR GLAND, RIGHT, EXCISION:  -- SALIVARY GLAND, NEGATIVE FOR CARCINOMA.     I.  MUSCLE, INFERIOR ORBICULARIS, EXCISION:  -- SKELETAL MUSCLE AND FIBROADIPOSE TISSUE, NEGATIVE FOR CARCINOMA.    J.  RIGHT INFRAORBITAL BASE OF SKULL MARGIN, EXCISION:  -- NERVE AND FIBROADIPOSE TISSUE, NEGATIVE FOR CARCINOMA.    K.  FORAMEN ROTUNDUM, \"FINAL TISSUE\", EXCISION:  -- FIBROADIPOSE TISSUE AND NERVE, NEGATIVE FOR CARCINOMA.      The [facility] Head and Neck Tumor Board considered available treatment options and made the following staging and " recommendations:    Staging and Recommendations:    Site: right Cutaneous squamous cell carcinoma of the right periorbital skin  Stage: No evidence of recurrence  Recommendation: Observation    Clinical Trial Status:   N/A     National site-specific guidelines were discussed with respect to the case.

## 2024-08-30 ENCOUNTER — APPOINTMENT (OUTPATIENT)
Dept: INTEGRATIVE MEDICINE | Facility: CLINIC | Age: 64
End: 2024-08-30
Payer: COMMERCIAL

## 2024-08-30 ENCOUNTER — OFFICE VISIT (OUTPATIENT)
Dept: BEHAVIORAL HEALTH | Facility: CLINIC | Age: 64
End: 2024-08-30
Payer: COMMERCIAL

## 2024-08-30 ENCOUNTER — TUMOR BOARD CONFERENCE (OUTPATIENT)
Dept: HEMATOLOGY/ONCOLOGY | Facility: HOSPITAL | Age: 64
End: 2024-08-30
Payer: COMMERCIAL

## 2024-08-30 DIAGNOSIS — C44.329 SQUAMOUS CELL CANCER OF SKIN OF RIGHT CHEEK: Primary | ICD-10-CM

## 2024-08-30 DIAGNOSIS — M79.10 MYALGIA: Primary | ICD-10-CM

## 2024-08-30 DIAGNOSIS — F32.0 CURRENT MILD EPISODE OF MAJOR DEPRESSIVE DISORDER WITHOUT PRIOR EPISODE (CMS-HCC): ICD-10-CM

## 2024-08-30 PROCEDURE — 90837 PSYTX W PT 60 MINUTES: CPT | Performed by: PSYCHOLOGIST

## 2024-08-30 NOTE — PROGRESS NOTES
Acupuncture Visit:     Subjective   Patient ID: Juan Miguel Ballesteros is a 64 y.o. male who presents for Facial Pain and Facial Swelling    Patient stated that his symptoms are baseline today      Initial intake:    PT seeking support for spasms and feeling of swelling local to right side of face.  It is intermittent.  He notes feeling it a few times every hour.          Session Information  Is this acupuncture treatment being billed to the patient's insurance company: No  Visit Type: Follow-up visit  Medical History Reviewed: I have reviewed pertinent medical history in EHR, and no contraindications are present to provide treatment         Review of Systems         Provider reviewed plan for the acupuncture session, precautions and contraindications. Patient/guardian/hospital staff has given consent to treat with full understanding of what to expect during the session. Before acupuncture began, provider explained to the patient to communicate at any time if the procedure was causing discomfort past their tolerance level. Patient agreed to advise acupuncturist. The acupuncturist counseled the patient on the risks of acupuncture treatment including pain, infection, bleeding, and no relief of pain. The patient was positioned comfortably. There was no evidence of infection at the site of needle insertions.    Objective   Physical Exam         Treatment Plan  Treatment Goals: Wellbeing improvement    Acupuncture Treatment  Patient Position: Seated and reclining  Needle Guage: 40 guage /.16/ Red seirin, 42 guage /.14/ Lime green seirin  Body Points - Bilateral: KD7, 4 Bullock,  (KM chin, YT, DU20)  Body Points - Right: GB41, GB34, GB20, SJ17, GB2, BL2  Needle Count In: 15  Needle Count Out: 15              Assessment/Plan
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
## 2024-09-06 ENCOUNTER — APPOINTMENT (OUTPATIENT)
Dept: INTEGRATIVE MEDICINE | Facility: CLINIC | Age: 64
End: 2024-09-06

## 2024-09-06 DIAGNOSIS — M79.10 MYALGIA: Primary | ICD-10-CM

## 2024-09-06 DIAGNOSIS — R22.0 FACIAL SWELLING: ICD-10-CM

## 2024-09-06 NOTE — PROGRESS NOTES
Acupuncture Visit:     Subjective   Patient ID: Juan Miguel Ballesteros is a 64 y.o. male who presents for Facial Pain and Facial Swelling    Patient stated that his symptoms are baseline today      Initial intake:    PT seeking support for spasms and feeling of swelling local to right side of face.  It is intermittent.  He notes feeling it a few times every hour.          Session Information  Is this acupuncture treatment being billed to the patient's insurance company: No  Visit Type: Follow-up visit  Medical History Reviewed: I have reviewed pertinent medical history in EHR, and no contraindications are present to provide treatment         Review of Systems         Provider reviewed plan for the acupuncture session, precautions and contraindications. Patient/guardian/hospital staff has given consent to treat with full understanding of what to expect during the session. Before acupuncture began, provider explained to the patient to communicate at any time if the procedure was causing discomfort past their tolerance level. Patient agreed to advise acupuncturist. The acupuncturist counseled the patient on the risks of acupuncture treatment including pain, infection, bleeding, and no relief of pain. The patient was positioned comfortably. There was no evidence of infection at the site of needle insertions.    Objective   Physical Exam              Acupuncture Treatment  Patient Position: Seated and reclining  Needle Guage: 40 guage /.16/ Red seirin, 42 guage /.14/ Lime green seirin  Body Points - Bilateral: KD7, 4 Bullock,  (YT, DU24, DU20)  Body Points - Right: GB41, GB34, GB20, GB2, LI20, ST3  Needle Count In: 15  Needle Count Out: 15              Assessment/Plan

## 2024-09-11 ENCOUNTER — APPOINTMENT (OUTPATIENT)
Dept: INTEGRATIVE MEDICINE | Facility: CLINIC | Age: 64
End: 2024-09-11
Payer: COMMERCIAL

## 2024-09-16 ENCOUNTER — APPOINTMENT (OUTPATIENT)
Dept: INTEGRATIVE MEDICINE | Facility: CLINIC | Age: 64
End: 2024-09-16
Payer: COMMERCIAL

## 2024-09-16 DIAGNOSIS — C44.320 SQUAMOUS CELL CARCINOMA, FACE: ICD-10-CM

## 2024-09-16 DIAGNOSIS — M79.10 MYALGIA: Primary | ICD-10-CM

## 2024-09-16 PROCEDURE — 97140 MANUAL THERAPY 1/> REGIONS: CPT | Performed by: HOSPITALIST

## 2024-09-16 ASSESSMENT — PAIN SCALES - GENERAL: PAINLEVEL_OUTOF10: 0 - NO PAIN

## 2024-09-16 NOTE — PROGRESS NOTES
Massage Therapy Visit:     Juan Miguel Ballesteros was referred by Dr. Guy.    Condition of Client Subjective :  Patient ID: Juan Miguel Ballesteros is a 64 y.o. male who presents for a 40 minute Law Therapy.  Patient is being treated for facial cancer.  Patient stated that he had been to the dermatologist.  He had a few areas treated on his face on the left side.  I avoided those areas.  Patient's facial cancer was treated on the right side.  I did Law Lymph Drainage MLD) on his face. Patient stated that the self care tips have helped with his facial discomfort.  Patient stated that he was able to relax.  He did notice an improvement with his muscle discomfort.       Session Information  Visit Type: Follow-up visit  Description of present complaint: Discomfort, Muscle tension        Objective   Pre-treatment Assessment  Arrival Mode: Ambulatory  Pain Score: 5 - Moderate pain  Anxiety Level (0-10): 2  Stress Level (0-10): 0  Coping Level (0-10): 8  Depression Level (0-10): 5  Fatigue Level (0-10): 5  Nausea Level (0-10): 0  Wellbeing Level (0-10): 3        Actions Assessment/Plan :  Provider reviewed plan for the massage session, precautions and contraindications. Patient/guardian/hospital staff has given consent to treat with full understanding of what to expect during the session. Before massage therapy began, provider explained to the patient to communicate at any time if the pressure was causing discomfort past their tolerance level. Patient agreed to advise therapist.    Massage Treatment  Patient Position: Table  Positioning Assistance: Did not need assistance  Massage Technique: Relaxation massage  Pressure Scale: 1 - Light pressure, 2 - Mild pressure    Response:  Post-treatment Assessment  Patient Noted Improvement of the Following Symptoms: Muscle tension  0-10 (Numeric) Pain Score: 0 - No pain  Anxiety Level (0-10): 0  Stress Level (0-10): 0  Coping Level (0-10): 10  Depression Level (0-10): 0  Fatigue Level (0-10):  0  Nausea Level (0-10): 0  Wellbeing Level (0-10): 0    Evaluation:   Patient will follow up as needed.

## 2024-09-20 ENCOUNTER — APPOINTMENT (OUTPATIENT)
Dept: BEHAVIORAL HEALTH | Facility: CLINIC | Age: 64
End: 2024-09-20
Payer: COMMERCIAL

## 2024-09-20 DIAGNOSIS — F32.0 CURRENT MILD EPISODE OF MAJOR DEPRESSIVE DISORDER WITHOUT PRIOR EPISODE (CMS-HCC): ICD-10-CM

## 2024-09-20 PROCEDURE — 90837 PSYTX W PT 60 MINUTES: CPT | Performed by: PSYCHOLOGIST

## 2024-09-21 NOTE — PROGRESS NOTES
9 AM [address] [facility] for MDD (60 MIN, 902-1000).    In-personal.  CBT/Supportive.  patient had a difficult week.  He got the results back from his MRI and at this point  will continue to monitor and he will have an MRI every 6 months.  Saw his dermatologist and had some places of concern on his face.  Was recommended that he start chemotherapy in which he will apply a lotion to his entire face on a daily basis for a week.  This will result in killing cancerous cells but may result in some scabbing on his face.  Last time he had this type of treatment was a couple years ago.  This will start next week.  Additionally he found out that a close friend who had cancer in the past recently  of a heart attack.  Discussed how there is no ideal way to respond to this kind of adversity.  He has had concerns that he has been more teary and we discussed alternatives other than that means he is weak.  Patient continues to go to [employer] which has been very meaningful to him.  Will continue to work on cognitive reframing and engagement in situations.

## 2024-09-25 ENCOUNTER — APPOINTMENT (OUTPATIENT)
Dept: INTEGRATIVE MEDICINE | Facility: CLINIC | Age: 64
End: 2024-09-25
Payer: COMMERCIAL

## 2024-09-25 DIAGNOSIS — R22.0 FACIAL SWELLING: ICD-10-CM

## 2024-09-25 DIAGNOSIS — C44.320 SQUAMOUS CELL CARCINOMA, FACE: ICD-10-CM

## 2024-09-25 DIAGNOSIS — K11.7 XEROSTOMIA: Primary | ICD-10-CM

## 2024-09-25 DIAGNOSIS — M79.10 MYALGIA: ICD-10-CM

## 2024-09-25 PROCEDURE — 99214 OFFICE O/P EST MOD 30 MIN: CPT | Performed by: HOSPITALIST

## 2024-09-25 NOTE — PROGRESS NOTES
Patient ID: Juan Miguel Ballesteros is a 64 y.o. male.  Referring Physician: No referring provider defined for this encounter.  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 Edward Pennington  Med[facility]c: Dr. Kyunghee Burkitt  [facility]: Dr. Jose Alberto May     Prior Therapy  6/16 - 7/27/21: Received 2 cycles of HD Cisplatin (100mg/m2), 3rd  dose held d/t neutropenia.  Right sinus protons 50 gy in 25 fx. Boost protons 10 gy in 5 fx      Surgery  4/26/21: Lateral rhinotomy with excision of right facial soft tissue mass, partial maxillectomy. Right submandibular gland excision with neck exploration for vessels  12/27/21:  Right medial maxillectomy, left nasal endoscopy with lysis of adhesion,  right inferior turbinate resection, left inferior turbinate reduction, outfracture.   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 Martinez     No swallowing issues or swelling in neck     Diet: usually eats out     PA: works out at gym with group, running 5 k on saturday    Sleep: not well, struggles to stay asleep, often shaky and anxious, intermittent     Stress: part time job  Belongs to a cancer program support group of survivors at  center  Management - not doing well, Dr. Dow (Psych) - takes welbutrin.  Likes to go to the gym and talk to people - even if he's tired     Natural Products:    mvi     ROS:  no ha, visual symptoms, hearing loss  no sob, chest pain, palp  ROS o/w non contributory, please see HPI         Objective  BSA: There is no height or weight on file to calculate BSA.  There were no vitals taken for this visit.     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 Edward HayesOnc: Dr. Kyunghee Burkitt  Rad[facility]: Dr. Jose Alberto May     Prior Therapy  6/16 - 7/27/21: Received 2 cycles of HD Cisplatin (100mg/m2), 3rd  dose held d/t neutropenia.  Right sinus protons 50 gy in 25 fx. Boost protons 10 gy in 5 fx      Surgery  4/26/21: Lateral rhinotomy with excision of right facial soft tissue mass, partial maxillectomy. Right submandibular gland excision with neck exploration for vessels  12/27/21:  Right medial maxillectomy, left nasal endoscopy with lysis of adhesion,  right inferior turbinate resection, left inferior turbinate reduction, outfracture.   2/20/22: Fat grafting to the right midface and periorbita  6/23/22: Bilateral Arvizu tube placement        CANCER SPECIFIC RECCS:        LIFESTYLE:  Diet - 5-9 fruits/veg/day (7 veg to 2 fruits)  Cruciferous Vegetables - Brussel Sprouts, Kale, Broccoli, Cauliflower  Limit sugar  Plant based anti-inflammatory whole foods diet  AICR New American Plate  PHYSICAL ACTIVITY 30 MIN/DAY     Reading:  Anticancer Living  Cancer Fighting Kitchen     Websites:  Cook for your Life  [email or web address]     SYMPTOM MANAGEMENT:  Neuropathy:  Integrative Modalities to consider:  Acupuncture weekly in [facility] weekly x 6 weeks then reassess - has not helped nor has massage  [facility] referral and Pain medicine referral (otherwise consider local injections)   Too far for him to go to [place] - [place] send to  [place] [facility] [place]     Consider yoga     Anxiety and Depression with Sleep issues: improved  Continue Psych follow ups  Consider Support Groups - going  Spiritual Care/Counseling     Integrative Modalities to consider:  Massage Therapy - not helping  Acupuncture - not helping  Reiki  Music Therapy (including HEALTH Tracks), expressive arts  Journaling     Natural Products to Consider:  Ashwaghanda (Rox) or PROZE NODZZ     Continue Prayer  Deep breathing: Deep abdominal breathing (5 min) in - the morning (in nose (4 counts), hold for 7, out through mouth (8 counts)  Music with Meditation   Yoga  walking, gardening is great  Focusing gratitude  [facility] - Guided Meditation  Consider NatureRx in future     Essential oils: Consider Lavender in a diffuser     Follow up: prn

## 2024-10-14 DIAGNOSIS — M79.2 NEURALGIA: ICD-10-CM

## 2024-10-18 ENCOUNTER — APPOINTMENT (OUTPATIENT)
Dept: BEHAVIORAL HEALTH | Facility: CLINIC | Age: 64
End: 2024-10-18
Payer: COMMERCIAL

## 2024-10-18 DIAGNOSIS — F32.0 CURRENT MILD EPISODE OF MAJOR DEPRESSIVE DISORDER WITHOUT PRIOR EPISODE (CMS-HCC): ICD-10-CM

## 2024-10-18 PROCEDURE — 90837 PSYTX W PT 60 MINUTES: CPT | Performed by: PSYCHOLOGIST

## 2024-10-19 NOTE — PROGRESS NOTES
"10 AM [address] [facility] Psych for MDD (60 MIN, 6325-0467).    In-personal.  CBT/Supportive.  Patient last seen 3 weeks ago.   [place] [employer] [place] along with his brother-in-law.  Spent a fair amount of time discussing this and issues he contend with.  Overall is a positive experience.  Feeling down this past week we discussed potential reasons for this including a bit of a let down and getting back into the grind of work.  Also discussed potential seasonal variation with movement.  Spent some time discussing consistent problems that he had with his neighbor and noise violations.  He confronted another neighbor regarding this who was being nice to him but he felt neighbor was a hip regret for being friendly with patient but seeming to ignore him when he was with the neighbor who is causing problems.  \"I went off on him.\"  We discussed potential ways to manage the situation and they are limited.  Was down anteriorly at the beginning of the week.  Encouraged him to see if this levels out.  He is back to the gym now. Next; 2 weeks.  "

## 2024-10-22 ENCOUNTER — APPOINTMENT (OUTPATIENT)
Dept: OTOLARYNGOLOGY | Facility: CLINIC | Age: 64
End: 2024-10-22
Payer: COMMERCIAL

## 2024-10-22 VITALS — BODY MASS INDEX: 28.71 KG/M2 | HEIGHT: 72 IN | WEIGHT: 212 LBS

## 2024-10-22 DIAGNOSIS — R04.0 EPISTAXIS: Primary | ICD-10-CM

## 2024-10-22 DIAGNOSIS — C44.320 SQUAMOUS CELL CARCINOMA, FACE: ICD-10-CM

## 2024-10-22 DIAGNOSIS — H90.3 SENSORINEURAL HEARING LOSS (SNHL) OF BOTH EARS: ICD-10-CM

## 2024-10-22 PROCEDURE — 3008F BODY MASS INDEX DOCD: CPT | Performed by: OTOLARYNGOLOGY

## 2024-10-22 PROCEDURE — 1036F TOBACCO NON-USER: CPT | Performed by: OTOLARYNGOLOGY

## 2024-10-22 PROCEDURE — 99214 OFFICE O/P EST MOD 30 MIN: CPT | Performed by: OTOLARYNGOLOGY

## 2024-10-22 ASSESSMENT — PATIENT HEALTH QUESTIONNAIRE - PHQ9
2. FEELING DOWN, DEPRESSED OR HOPELESS: NOT AT ALL
SUM OF ALL RESPONSES TO PHQ9 QUESTIONS 1 AND 2: 0
1. LITTLE INTEREST OR PLEASURE IN DOING THINGS: NOT AT ALL

## 2024-10-22 NOTE — PROGRESS NOTES
HPI: Reports return of nosebleeds this week. Both sides. Controlled with in 20 minutes. Last nosebleed before this week was months prior.     Using saline spray twice a day, mornign and night.     Worsening right sided facial spasms. Starting acupuncture.     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 The tumor tracted along the infraorbital nerve all the way to the foramen rotundum. The last margin was clear.     Right orbital rim SCC had this resected by an outside provider in 9/2020.  At the time of resection, he also had a septoplasty and cryotherapy performed for rhinorrhea.     Recalls having multiple prior skin cancers on the face as well that was treated with liquid chemotherapy in the past and believes he may have had a prior skin lesion over his current cancer.        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)   · Ectropion (374.10) (H02.109)   · Encounter for monitoring ototoxic drug therapy (V58.83,V58.69) (Z51.81,Z79.899)   · Epiphora (375.20) (H04.209)   · Epiphora of left side (375.20) (H04.202)   · Epiphora of right side (375.20) (H04.201)   · Epistaxis (784.7) (R04.0)   · Eye symptoms (379.99) (H57.9)   · Face pain (784.0) (R51.9)   · Facial asymmetries (754.0) (Q67.0)   · Facial swelling (784.2) (R22.0)   · SIMONA (generalized anxiety disorder) (300.02) (F41.1)   · Hearing loss (389.9) (H91.90)   · Hearing loss, unspecified hearing loss type, unspecified laterality (389.9) (H91.90)   · History of nasal congestion  (V12.69) (Z87.898)   · Left shoulder pain (719.41) (M25.512)   · Major depressive disorder, single episode, mild (296.21) (F32.0)   · MDD (major depressive disorder) (296.20) (F32.9)   · Nasal congestion (478.19) (R09.81)   · NLDO, acquired (nasolacrimal duct obstruction) (375.56) (H04.559)   · Observation for suspected neoplasm (V71.1) (Z03.89)   · Oral motor dysfunction (787.21) (R13.11)   · Otalgia of both ears (388.70) (H92.03)   · Sensation of fullness in both ears (388.8) (H93.8X3)   · Sensorineural hearing loss (SNHL) of both ears (389.18) (H90.3)   · Sinusitis (473.9) (J32.9)   · Squamous cell cancer of skin of right cheek (173.32) (C44.329)   · Squamous cell carcinoma of skin of orbital rim (173.32) (C44.329)   · Tinnitus of both ears (388.30) (H93.13)   · Weakness of shoulder (719.61) (R29.898)   · Wound infection (958.3) (T14.8XXA,L08.9)     Past Medical History     · Chronic maxillary sinusitis (473.0) (J32.0)   · Ectropion (374.10) (H02.109)   · Epiphora (375.20) (H04.209)   · Epiphora of right side (375.20) (H04.201)   · Facial swelling (784.2) (R22.0)   · History of Basal cell carcinoma (BCC) of skin of other part of torso (V10.83) (Z85.828)   · History of nasal congestion (V12.69) (Z87.898)   · Resolved Date: 14 Dec 2021   · Sensorineural hearing loss (SNHL) of both ears (389.18) (H90.3)   · Squamous cell carcinoma of skin of orbital rim (173.32) (C44.329)   · Tinnitus of both ears (388.30) (H93.13)     Surgical History     · History of Tonsillectomy     Social History     · Never smoked tobacco (V49.89) (Z78.9)   · Occasional alcohol use     Allergies     · No Known Drug Allergies   Recorded By: Nilda Anders; 4/2/2021 9:23:23 AM     Current Meds     Medication Name Instruction   buPROPion HCl ER (SR) 150 MG Oral Tablet Extended Release 12 Hour TAKE 1 TABLET TWICE DAILY.   Mens Multivitamin TABS        Vitals  Vital Signs     Recorded: [id number] 10:03AM   Temperature 97.3 C   Height 6 ft     Weight 205 lb 6.4 oz   BMI Calculated 27.86 kg/m2   BSA Calculated 2.15   Tobacco Use b) No   PHQ-2  #1. Over the last 2 weeks have you felt down, depressed or hopeless?  (If yes, answer PHQ-9 below) No   PHQ-2  #2. Over the last 2 weeks have you felt little interest  or pleasure in doing things?  (If yes, answer PHQ-9 below) No   Falls Screening (Age 18+) a) No falls within the last year      Physical Exam  CONSTITUTIONAL: Vitals -reviewed from intake field, well developed, well nourished.   RESPIRATION: Breathing comfortably, no stridor.  CV: No clubbing/cyanosis/edema in hands.  EYES: EOM Intact, mild inflamed conjunctiva/sclera on right. depression/recession of the right inferior periorbital region much improved after fat grafting, epiphora remains, stent in place  NEURO: Alert and oriented times 3, Cranial nerves II-XII grossly intact  HEAD AND FACE: Post-radiation changes to right cheek extending to area around and beneath the right eye resolving. No masses or lesions, mild lateral right ectropion present, epiphora present, continued improvement in right orbicularis oris function, with continued development of nasolabial folds and creases with maximal effort however has approximately a 2 cm area of firmness in the lower aspect of the right cheek just lateral to the sulcus appears to have resolved more superiorly there is a linear area of fullness in the subcutaneous tissues that is nontender, this is consistent with either fat graft or pedicle from flap  SALIVARY GLANDS: Parotid and submandibular glands normal bilaterally.  EARS: Normal external ears, external auditory canals, and TMs to otoscopy, normal hearing to whispered voice.  NOSE: External nose midline, anterior rhinoscopy reveals less crusting on the right.   ORAL CAVITY/OROPHARYNX/LIPS: improved mucous membranes, normal floor of mouth/tongue/OP, no masses or lesions are noted.  PHARYNGEAL WALLS AND NASOPHARYNX: No masses noted. Mucosa appears clean  and moist  NECK/LYMPH: No LAD, no thyroid masses. Trachea palpably midline  SKIN: surgical incisions healing  PSYCH: Alert and oriented with appropriate mood and affect     improved CN 7 midface motion    Procedure:  Bilateral nasal cavities sprayed with afrin/lidocaine. Bilateral nasal cavities inspected with flexible scope without evidence of mass or lesions or source of bleeding.     Imp:     CONNOR      Increase nasal saline spray daily. Home humidification especially at night.      Agree with trying acupuncture for continued right sided neuralgia. May re start introducing gabapentin if unsuccessful.      Will schedule repeat MRI.      Alexandra Brito MD  Dept. of Otolaryngology - Head and Neck Surgery, PGY-5        The above resident note has been reviewed and confirmed.  Patient was seen and examined with the resident today.  Documentation has been reviewed.        Patient with some continued pain in the right face and tugging as well as some lip sag that is impacting biting his lip      Have talked him about potentially seeing Dr. Lima to see if a revision could be performed and also will discuss with neurology whether there is an alternate treatment for what is a component of trigeminal neuralgia and/or phantom pain here given that we do not see anything that would warrant concern at the level of V2 or intracranial component which was reviewed at tumor board and with neuroradiology on multiple scans      He would like to try to treat this not chemically but I did recommend gabapentin which made him fatigued in the past but perhaps there could be a dose adjustment that may be beneficial but not give him symptoms      I will discuss with neurology otherwise we will see him in 6 months

## 2024-10-24 DIAGNOSIS — M79.2 NEURALGIA: ICD-10-CM

## 2024-10-25 ENCOUNTER — APPOINTMENT (OUTPATIENT)
Dept: AUDIOLOGY | Facility: CLINIC | Age: 64
End: 2024-10-25
Payer: COMMERCIAL

## 2024-10-25 DIAGNOSIS — H90.3 SENSORINEURAL HEARING LOSS (SNHL) OF BOTH EARS: Primary | ICD-10-CM

## 2024-10-25 PROCEDURE — 92557 COMPREHENSIVE HEARING TEST: CPT | Performed by: AUDIOLOGIST

## 2024-10-25 PROCEDURE — 92550 TYMPANOMETRY & REFLEX THRESH: CPT | Performed by: AUDIOLOGIST

## 2024-10-28 ASSESSMENT — ENCOUNTER SYMPTOMS
VOMITING: 0
NAUSEA: 0
CONSTIPATION: 0
HEADACHES: 0
ABDOMINAL PAIN: 0
LIGHT-HEADEDNESS: 0
ARTHRALGIAS: 0
SORE THROAT: 0
COUGH: 0
TROUBLE SWALLOWING: 0
CHILLS: 0
FEVER: 0
NUMBNESS: 0
DIARRHEA: 0
LEG SWELLING: 0
SHORTNESS OF BREATH: 0

## 2024-10-29 ENCOUNTER — LAB (OUTPATIENT)
Dept: LAB | Facility: HOSPITAL | Age: 64
End: 2024-10-29
Payer: COMMERCIAL

## 2024-10-29 ENCOUNTER — OFFICE VISIT (OUTPATIENT)
Dept: HEMATOLOGY/ONCOLOGY | Facility: HOSPITAL | Age: 64
End: 2024-10-29
Payer: COMMERCIAL

## 2024-10-29 VITALS
TEMPERATURE: 97.3 F | BODY MASS INDEX: 28.73 KG/M2 | DIASTOLIC BLOOD PRESSURE: 63 MMHG | OXYGEN SATURATION: 100 % | WEIGHT: 211.86 LBS | HEART RATE: 60 BPM | RESPIRATION RATE: 20 BRPM | SYSTOLIC BLOOD PRESSURE: 129 MMHG

## 2024-10-29 DIAGNOSIS — S04.51XD: ICD-10-CM

## 2024-10-29 DIAGNOSIS — C44.329 SQUAMOUS CELL CANCER OF SKIN OF RIGHT CHEEK: ICD-10-CM

## 2024-10-29 DIAGNOSIS — K11.7 XEROSTOMIA DUE TO RADIOTHERAPY: ICD-10-CM

## 2024-10-29 DIAGNOSIS — G51.9 FACIAL NEUROPATHY: ICD-10-CM
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
## 2024-10-29 DIAGNOSIS — C44.329 SQUAMOUS CELL CANCER OF SKIN OF RIGHT CHEEK: Primary | ICD-10-CM

## 2024-10-29 DIAGNOSIS — Z85.89 ENCOUNTER FOR FOLLOW-UP SURVEILLANCE OF HEAD AND NECK CANCER: ICD-10-CM

## 2024-10-29 DIAGNOSIS — Z08 ENCOUNTER FOR FOLLOW-UP SURVEILLANCE OF HEAD AND NECK CANCER: ICD-10-CM

## 2024-10-29 DIAGNOSIS — K08.89 CHEWING DIFFICULTY: ICD-10-CM

## 2024-10-29 LAB
ALBUMIN SERPL BCP-MCNC: 4.2 G/DL (ref 3.4–5)
ALP SERPL-CCNC: 41 U/L (ref 33–136)
ALT SERPL W P-5'-P-CCNC: 24 U/L (ref 10–52)
ANION GAP SERPL CALC-SCNC: 11 MMOL/L (ref 10–20)
AST SERPL W P-5'-P-CCNC: 24 U/L (ref 9–39)
BASOPHILS # BLD AUTO: 0.03 X10*3/UL (ref 0–0.1)
BASOPHILS NFR BLD AUTO: 0.9 %
BILIRUB SERPL-MCNC: 1 MG/DL (ref 0–1.2)
BUN SERPL-MCNC: 13 MG/DL (ref 6–23)
CALCIUM SERPL-MCNC: 9.3 MG/DL (ref 8.6–10.3)
CHLORIDE SERPL-SCNC: 104 MMOL/L (ref 98–107)
CO2 SERPL-SCNC: 30 MMOL/L (ref 21–32)
CREAT SERPL-MCNC: 1.08 MG/DL (ref 0.5–1.3)
EGFRCR SERPLBLD CKD-EPI 2021: 77 ML/MIN/1.73M*2
EOSINOPHIL # BLD AUTO: 0.13 X10*3/UL (ref 0–0.7)
EOSINOPHIL NFR BLD AUTO: 3.8 %
ERYTHROCYTE [DISTWIDTH] IN BLOOD BY AUTOMATED COUNT: 12.5 % (ref 11.5–14.5)
GLUCOSE SERPL-MCNC: 100 MG/DL (ref 74–99)
HCT VFR BLD AUTO: 41.9 % (ref 41–52)
HGB BLD-MCNC: 13.9 G/DL (ref 13.5–17.5)
IMM GRANULOCYTES # BLD AUTO: 0.01 X10*3/UL (ref 0–0.7)
IMM GRANULOCYTES NFR BLD AUTO: 0.3 % (ref 0–0.9)
LYMPHOCYTES # BLD AUTO: 0.86 X10*3/UL (ref 1.2–4.8)
LYMPHOCYTES NFR BLD AUTO: 24.9 %
MCH RBC QN AUTO: 30.4 PG (ref 26–34)
MCHC RBC AUTO-ENTMCNC: 33.2 G/DL (ref 32–36)
MCV RBC AUTO: 92 FL (ref 80–100)
MONOCYTES # BLD AUTO: 0.5 X10*3/UL (ref 0.1–1)
MONOCYTES NFR BLD AUTO: 14.5 %
NEUTROPHILS # BLD AUTO: 1.92 X10*3/UL (ref 1.2–7.7)
NEUTROPHILS NFR BLD AUTO: 55.6 %
NRBC BLD-RTO: 0 /100 WBCS (ref 0–0)
PLATELET # BLD AUTO: 195 X10*3/UL (ref 150–450)
POTASSIUM SERPL-SCNC: 4.8 MMOL/L (ref 3.5–5.3)
PROT SERPL-MCNC: 7.1 G/DL (ref 6.4–8.2)
RBC # BLD AUTO: 4.57 X10*6/UL (ref 4.5–5.9)
SODIUM SERPL-SCNC: 140 MMOL/L (ref 136–145)
TSH SERPL-ACNC: 0.84 MIU/L (ref 0.44–3.98)
WBC # BLD AUTO: 3.5 X10*3/UL (ref 4.4–11.3)

## 2024-10-29 PROCEDURE — 99215 OFFICE O/P EST HI 40 MIN: CPT | Performed by: STUDENT IN AN ORGANIZED HEALTH CARE EDUCATION/TRAINING PROGRAM

## 2024-10-29 PROCEDURE — 1036F TOBACCO NON-USER: CPT | Performed by: STUDENT IN AN ORGANIZED HEALTH CARE EDUCATION/TRAINING PROGRAM

## 2024-10-29 PROCEDURE — 80053 COMPREHEN METABOLIC PANEL: CPT

## 2024-10-29 PROCEDURE — 36415 COLL VENOUS BLD VENIPUNCTURE: CPT

## 2024-10-29 PROCEDURE — 85025 COMPLETE CBC W/AUTO DIFF WBC: CPT

## 2024-10-29 PROCEDURE — 84443 ASSAY THYROID STIM HORMONE: CPT

## 2024-10-29 ASSESSMENT — PAIN SCALES - GENERAL: PAINLEVEL_OUTOF10: 0-NO PAIN

## 2024-10-31 ENCOUNTER — APPOINTMENT (OUTPATIENT)
Dept: NEUROLOGY | Facility: CLINIC | Age: 64
End: 2024-10-31
Payer: COMMERCIAL

## 2024-10-31 VITALS
TEMPERATURE: 97.4 F | HEART RATE: 63 BPM | DIASTOLIC BLOOD PRESSURE: 78 MMHG | SYSTOLIC BLOOD PRESSURE: 128 MMHG | RESPIRATION RATE: 20 BRPM

## 2024-10-31 DIAGNOSIS — M79.2 NEURALGIA: Primary | ICD-10-CM

## 2024-10-31 PROCEDURE — 99205 OFFICE O/P NEW HI 60 MIN: CPT | Performed by: PSYCHIATRY & NEUROLOGY

## 2024-10-31 PROCEDURE — 1036F TOBACCO NON-USER: CPT | Performed by: PSYCHIATRY & NEUROLOGY

## 2024-10-31 ASSESSMENT — VISUAL ACUITY: VA_NORMAL: 1

## 2024-11-01 ENCOUNTER — APPOINTMENT (OUTPATIENT)
Dept: BEHAVIORAL HEALTH | Facility: CLINIC | Age: 64
End: 2024-11-01
Payer: COMMERCIAL

## 2024-11-01 DIAGNOSIS — F32.0 CURRENT MILD EPISODE OF MAJOR DEPRESSIVE DISORDER WITHOUT PRIOR EPISODE (CMS-HCC): ICD-10-CM

## 2024-11-01 PROCEDURE — 90837 PSYTX W PT 60 MINUTES: CPT | Performed by: PSYCHOLOGIST

## 2024-11-18 ENCOUNTER — OFFICE VISIT (OUTPATIENT)
Dept: BEHAVIORAL HEALTH | Facility: CLINIC | Age: 64
End: 2024-11-18
Payer: COMMERCIAL

## 2024-11-18 VITALS
BODY MASS INDEX: 29.13 KG/M2 | OXYGEN SATURATION: 99 % | DIASTOLIC BLOOD PRESSURE: 73 MMHG | HEART RATE: 60 BPM | TEMPERATURE: 97.3 F | SYSTOLIC BLOOD PRESSURE: 126 MMHG | WEIGHT: 214.8 LBS | RESPIRATION RATE: 18 BRPM

## 2024-11-18 DIAGNOSIS — F32.0 MAJOR DEPRESSIVE DISORDER, SINGLE EPISODE, MILD (CMS-HCC): ICD-10-CM

## 2024-11-18 PROCEDURE — 99213 OFFICE O/P EST LOW 20 MIN: CPT | Mod: AM | Performed by: PSYCHIATRY & NEUROLOGY

## 2024-11-18 PROCEDURE — 99213 OFFICE O/P EST LOW 20 MIN: CPT | Performed by: PSYCHIATRY & NEUROLOGY

## 2024-11-18 PROCEDURE — 1036F TOBACCO NON-USER: CPT | Performed by: PSYCHIATRY & NEUROLOGY

## 2024-11-18 RX ORDER — BUPROPION HYDROCHLORIDE 150 MG/1
150 TABLET, EXTENDED RELEASE ORAL EVERY MORNING
Qty: 90 TABLET | Refills: 1 | Status: SHIPPED | OUTPATIENT
Start: 2024-11-18 | End: 2024-11-22 | Stop reason: SDUPTHER

## 2024-11-18 ASSESSMENT — ENCOUNTER SYMPTOMS
LOSS OF SENSATION IN FEET: 0
DEPRESSION: 0
OCCASIONAL FEELINGS OF UNSTEADINESS: 0

## 2024-11-18 ASSESSMENT — PAIN SCALES - GENERAL: PAINLEVEL_OUTOF10: 0-NO PAIN

## 2024-11-18 NOTE — PROGRESS NOTES
"Patient  Juan Miguel Ballesteros is a 64 y.o. male, presented for   Chief Complaint   Patient presents with    Follow-up     Fuv    .    Referred by: Ricardo Becker MD  [id number] [place]  [place] [address]     History of presenting Illness:  Patient reports that his cousin passed away recently, he was close to her. Also, lost a close friend recently. He is remembering them and misses them. Report survivor's guilt, \"why did she had to go and I am here .... She has two kids and everything.\" Talked about his relationship.   Reports that his sleep is interrupted, able to fall asleep but if he josie up at night he is not able to fall back asleep. Reports that he feels that he is sleeping well in AM when his alarm gets off. Has signed up for [facility] for accupuncture, massage therapy.  Restarted seeing therapist, plan to see every other week. Has been going to work regularly, has been enjoying work.      Has had a few social engagement. Has some social supports. Denies persistent depressed mood. No crying episodes. No suicidal or homicidal ideas, intent or plans.                           Discussed his medication regimen, he remains resistant to trying any medication that can potentially have any sexual side effects.     Review of Systems  Anxiety: General Anxiety Disorder (SIMONA)SIMONA Behaviors: excessive anxiety/worry and irritability  Depression: guilt and interest  Delirium: negative  Psychosis: negative  Mara: negative  Safety Issues: none  Psychiatric ROS Comment: NOne     Past Psychiatric History:   Previous therapy: no  Previous psychiatric treatment and medication trials: yes - wellbutring  Previous psychiatric hospitalizations: no  Previous diagnoses: no  Previous suicide attempts: no  History of violence: no  Depression screening was performed with standardized tool: Not Screened    Past Medical History  Past Medical History:   Diagnosis Date    Chronic maxillary sinusitis 04/05/2022 "    Chronic maxillary sinusitis    Localized swelling, mass and lump, head 06/16/2022    Facial swelling    Personal history of other malignant neoplasm of skin     History of other malignant neoplasm of skin    Personal history of other specified conditions 12/14/2021    History of nasal congestion    Sensorineural hearing loss, bilateral 11/29/2021    Sensorineural hearing loss (SNHL) of both ears    Squamous cell carcinoma of skin of other parts of face 10/11/2022    Squamous cell carcinoma of skin of orbital rim    Tinnitus, bilateral 11/29/2021    Tinnitus of both ears    Unspecified ectropion of unspecified eye, unspecified eyelid 06/16/2022    Ectropion    Unspecified epiphora, right side 08/18/2022    Epiphora of right side    Unspecified epiphora, unspecified side 04/11/2022    Epiphora       Surgical History  Past Surgical History:   Procedure Laterality Date    OTHER SURGICAL HISTORY  04/02/2021    Tonsillectomy        Family History:  Family History   Problem Relation Name Age of Onset    Uterine cancer Mother      Lymphoma Father         Social History:  Social History     Socioeconomic History    Marital status:      Spouse name: Not on file    Number of children: Not on file    Years of education: Not on file    Highest education level: Not on file   Occupational History    Not on file   Tobacco Use    Smoking status: Never    Smokeless tobacco: Never   Substance and Sexual Activity    Alcohol use: Never    Drug use: Never    Sexual activity: Defer   Other Topics Concern    Not on file   Social History Narrative    Not on file     Social Drivers of Health     Financial Resource Strain: Not on file   Food Insecurity: Not on file   Transportation Needs: Not on file   Physical Activity: Not on file   Stress: Not on file   Social Connections: Not on file   Intimate Partner Violence: Not on file   Housing Stability: Not on file         Medication:  Current Outpatient Medications   Medication  "Instructions    buPROPion SR (WELLBUTRIN SR) 150 mg, oral, Every morning    halobetasol propionate (Bryhali) 0.01 % lotion Bryhali ; 0 Lotion prn Quantity: 0 Refills: 0 Ordered: 8-Apr-2021 Gabriela Gillespie Generic Substitution Allowed    multivit,calc,min-FA-K1-lycop (One-A-Day Men's Complete) 240 mcg-30 mcg- 300 mcg tablet Take by mouth.    valACYclovir (Valtrex) 1 gram tablet TAKE TWO TABLETS BY MOUTH TWICE A DAY AT FIRST SYMPTOM ONSET        Allergies  .No Known Allergies     Vitals:  Visit Vitals  /73   Pulse 60   Temp 36.3 °C (97.3 °F)   Resp 18   Wt 97.4 kg (214 lb 12.8 oz)   SpO2 99%   BMI 29.13 kg/m²   Smoking Status Never   BSA 2.22 m²        Mental Status Exam  General: Appears stated age, dressed appropriately  Appearance: Fair hygiene and grooming.  Attitude: Pleasant  Behavior: Cooperative  Motor Activity: WNL  Speech: Soft, normal rate, rhythm and volume.  Mood: \"overwhelmed.\"  Affect: Congruent  Thought Process: Linear and goal directed  Thought Content: Denies suicidal or homicidal ideas, intent or plans. No IOR or delusions.  Thought Perception: No perceptual abnormalities.  Cognition: Grossly intact  Insight: Intact  Judgement: Intact        Psychiatric Risk Assessment  Violence Risk Assessment: none  Acute Risk of Harm to Others is Considered: low   Suicide Risk Assessment: none  Protective Factors against Suicide: none  Acute Risk of Harm to Self is Considered: low       Labs:  Thyroid Stimulating Hormone   Date/Time Value Ref Range Status   10/29/2024 08:42 AM 0.84 0.44 - 3.98 mIU/L Final        Diagnosis:  Problem List Items Addressed This Visit    None       Assessment/Plan   Problem List Items Addressed This Visit          Mental Health    Major depressive disorder, single episode, mild (CMS-HCC)    Relevant Medications    buPROPion SR (Wellbutrin SR) 150 mg 12 hr tablet       64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 Depression appears to be multifactorial, as his cancer recurred when he decided to take alf and has also been struggling financially. He was seen for a few months in 2022, was not amenable to antidepressant medication at the time and worked on CBT with some imprvement. HE was started on wellbutrin in 2023. He returns to clinic after 7-8 months. He is not engaged in therapy anymore. Continues to have anxiety and depressive symptoms, without significant functional impairment.      Discussed r/b/a of medication in detail, answered all his questions to his satisfaction.      Plan:  1. Major depressive disorder, recrrent, moderate; SIMONA  - Continue bupropion SR 150mg PO QAM  - Individual therapy weekly, he continues.  - Provided supportive therapy focused on coping.  - No acute safety issues.  - Not interested in medication for sleep.    Medication Consent  Medication Consent: risks, benefits, side effects reviewed for all ordered meds    Please schedule a follow-up with your PCP for your ongoing medical problems.    Dr. Becker is in office on Mon- Thu. Phone calls may not be returned until next day I am in office.   For scheduling questions: [phone]  For other questions: [phone]       For [facility] residents, [employer] is a 24/7 hotline that you can call for assistance: [phone].     Please call 9-1-1 or go to the nearest emergency room if you feel worse or have thoughts of hurting yourself or anyone else, or hearing voices, seeing visions or having new or scary thoughts about people around you.    I spent    40 minutes in the professional and overall care of this patient.    Ricardo Becker MD

## 2024-11-21 DIAGNOSIS — F32.0 MAJOR DEPRESSIVE DISORDER, SINGLE EPISODE, MILD (CMS-HCC): ICD-10-CM

## 2024-11-22 ENCOUNTER — APPOINTMENT (OUTPATIENT)
Dept: BEHAVIORAL HEALTH | Facility: CLINIC | Age: 64
End: 2024-11-22
Payer: COMMERCIAL

## 2024-11-22 DIAGNOSIS — F32.0 CURRENT MILD EPISODE OF MAJOR DEPRESSIVE DISORDER WITHOUT PRIOR EPISODE (CMS-HCC): ICD-10-CM

## 2024-11-22 PROCEDURE — 90837 PSYTX W PT 60 MINUTES: CPT | Performed by: PSYCHOLOGIST

## 2024-11-22 RX ORDER — BUPROPION HYDROCHLORIDE 150 MG/1
150 TABLET, EXTENDED RELEASE ORAL EVERY MORNING
Qty: 90 TABLET | Refills: 1 | Status: SHIPPED | OUTPATIENT
Start: 2024-11-22 | End: 2025-05-21

## 2024-11-22 RX ORDER — BUPROPION HYDROCHLORIDE 150 MG/1
150 TABLET, EXTENDED RELEASE ORAL EVERY MORNING
Qty: 90 TABLET | Refills: 1 | OUTPATIENT
Start: 2024-11-22 | End: 2025-05-21

## 2024-11-22 NOTE — PROGRESS NOTES
10 AM [address] [facility] Psych for MDD (60 MIN, 6130-8801).    In-personal.  CBT/Supportive. Mood stable.  Discussed some frustrations with other drivers when coming to this appointment.  Has gotten better with catching himself in the moment and stepping back and putting situations in perspective.  Discussed how no one is perfect about it.  Discussed some frustrations with getting his bupropion prescription failed, almost out of medications.  Also some frustration with pharmacy that is getting him the pain cream.  Discussed the importance of staying in problem-solving mode.  He has done that for the most part.  Discussed recent interaction with sister who he saw at a birthday party who brought up his brother.  Frustrated with sisters response to brothers suicidal ideation as well as being bothered by the fact that she told others.  Patient continues to reach out to her brother and provide support.  Had a good time at the [employer] football game a few weeks ago and is going to [employer] tomorrow.  Feels that his best during football Saturdays. Next: 1 month.

## 2024-11-26 ENCOUNTER — APPOINTMENT (OUTPATIENT)
Dept: OTOLARYNGOLOGY | Facility: CLINIC | Age: 64
End: 2024-11-26
Payer: COMMERCIAL

## 2024-12-09 NOTE — PROGRESS NOTES
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 The tumor tracted along the infraorbital nerve all the way to the foramen rotundum. The last margin was clear. There was significant perineural invasion. He completed a combination of chemotherapy and radiation.  There is no obvious evidence of tumor recurrence.  He continues to have significant discomfort for which he is on medication.    Serous otitis on the right for which I put him on Flonase and Mucinex D.  He is scheduled to be fitted with a hearing aid next week.     I will see him in 6 months.      Chief Complaint     Follow-up regarding the management of a squamous cell carcinoma of the soft tissue around the right orbit      History of Present Illness    This gentleman was seen at the request of a local colleague. In August 2020 he started feeling a lump near the medial aspect of his right orbital rim. This was painful and it was causing his excruciating discomfort on palpation. This was removed and surprisingly showed up as a poorly differentiated squamous cell carcinoma. He was presented at our tumor board and the origin of this lesion was not explained. I did send him for a PET scan as well as an MRI of the area. I personally reviewed both studies that were done in November 2020 and they were also read by the radiologist and no lesion could be identified. I saw him in late March 2021 with an obvious clinical recurrence. On April 26, 2021 he was brought to surgery. He was found to have this ill-defined lesion that extended in every direction and also had significant perineural extension. The infraorbital nerve was tracked back all the way into the pterygomaxillary fossa. The final margin on the nerve was negative. He was presented at our tumor board and it was felt that he should undergo a combination of radiation and chemotherapy. This was completed at the end of July 2021. He  had an MRI done in August 2024 which was negative for anything worrisome. He had a TSH level in October 2024 which was normal. His last chest x-ray was in September 2023 and was normal.  He continues to have some discomfort on the right side of his face. It seems to be more common than previously.     Physical Exam    Examination of the face and neck does not show any palpable masses. There is good eye mobility. The anterior nasal exam shows a little bit of crusting but very minimal. His right nostril is definitely smaller than the left. Palpation of the parotid, neck, thyroid feel fails to show any worrisome masses or adenopathies. The ear examination shows an air-fluid level.  The intraoral exam is also negative. His upper lip has a tendency to roll in on the right.     A nasal endoscopy was carried out. Under topical Xylocaine and Major-Synephrine the scope was introduced through both nostrils. On the right side of the nose he does have this a cavity that is slightly crusted. There is no evidence of any tumor recurrence.

## 2024-12-10 ENCOUNTER — APPOINTMENT (OUTPATIENT)
Facility: CLINIC | Age: 64
End: 2024-12-10
Payer: COMMERCIAL

## 2024-12-10 VITALS — WEIGHT: 213 LBS | HEIGHT: 72 IN | BODY MASS INDEX: 28.85 KG/M2

## 2024-12-10 DIAGNOSIS — M79.2 NEURALGIA: ICD-10-CM

## 2024-12-10 DIAGNOSIS — C44.320 SQUAMOUS CELL CARCINOMA, FACE: Primary | ICD-10-CM

## 2024-12-10 DIAGNOSIS — H69.90 DYSFUNCTION OF EUSTACHIAN TUBE, UNSPECIFIED LATERALITY: ICD-10-CM

## 2024-12-10 PROCEDURE — 3008F BODY MASS INDEX DOCD: CPT | Performed by: OTOLARYNGOLOGY

## 2024-12-10 PROCEDURE — 31231 NASAL ENDOSCOPY DX: CPT | Performed by: OTOLARYNGOLOGY

## 2024-12-10 PROCEDURE — 99213 OFFICE O/P EST LOW 20 MIN: CPT | Performed by: OTOLARYNGOLOGY

## 2024-12-10 PROCEDURE — 1036F TOBACCO NON-USER: CPT | Performed by: OTOLARYNGOLOGY

## 2024-12-10 RX ORDER — FLUTICASONE PROPIONATE 50 MCG
2 SPRAY, SUSPENSION (ML) NASAL DAILY
Qty: 48 G | Refills: 1 | Status: SHIPPED | OUTPATIENT
Start: 2024-12-10 | End: 2025-12-10

## 2024-12-17 ENCOUNTER — CLINICAL SUPPORT (OUTPATIENT)
Dept: AUDIOLOGY | Facility: CLINIC | Age: 64
End: 2024-12-17
Payer: COMMERCIAL

## 2024-12-17 DIAGNOSIS — H90.3 SENSORINEURAL HEARING LOSS (SNHL) OF BOTH EARS: Primary | ICD-10-CM

## 2024-12-17 PROCEDURE — 92700 UNLISTED ORL SERVICE/PX: CPT | Performed by: AUDIOLOGIST

## 2024-12-19 NOTE — PROGRESS NOTES
Name: Juan Miguel Ballesteros  YOB: 1960  Age: 64 y.o.    Date of Evaluation:  12/17/2024    History of Present Illness:  Juan Miguel Ballesteros , 64 y.o., was seen for a flex trial hearing aid fitting.  Juan Miguel Ballesteros has a history of bilateral tinnitus and hearing loss. He has a history of head and neck cancer with resection. Most recent hearing test from 12/25/2024 indicates a mild sensorineural hearing loss bilaterally. He is being fit with the [employer] hearing aids.     Phonak P90-R Flex Trial hearing aids  Right serial # [id number]  Left serial # [id number]    Programming was completed at 100% of Phonak prescriptive fitting strategy. Program and volume control signals were demonstrated for the patient. Care and maintenance of the device were discussed with the patient. The patient was able to properly insert and remove the hearing instrument from the ear. In addition to today's verbal instruction of the hearing devices, the patient was given written instructions from the hearing aid . Hearing aid limitations were discussed at length as well as realistic expectations. The patient was advised in order to receive full benefit of amplification, consistent use during all waking hours is recommended.     Hearing aids were paired to Juan Miguel's cell phone and to the [employer] nitesh. He was given pricing for hearing aids and encouraged to call his insurance to determine hearing aid benefit and in network provider.    Treatment Plan:  Follow-up in 2-3 weeks for a hearing aid flex trial return  Annual hearing test    Time: 8200-2503    Completed by:  Luz Diamond, CCC-A  Licensed Senior Audiologist

## 2024-12-20 ENCOUNTER — APPOINTMENT (OUTPATIENT)
Dept: BEHAVIORAL HEALTH | Facility: CLINIC | Age: 64
End: 2024-12-20
Payer: COMMERCIAL

## 2024-12-20 DIAGNOSIS — F32.0 CURRENT MILD EPISODE OF MAJOR DEPRESSIVE DISORDER WITHOUT PRIOR EPISODE (CMS-HCC): ICD-10-CM

## 2024-12-20 PROCEDURE — 90837 PSYTX W PT 60 MINUTES: CPT | Performed by: PSYCHOLOGIST

## 2024-12-20 NOTE — PROGRESS NOTES
8 AM 80609 Ind Psych for MDD (60 MIN, [phone]).    In-personal.  CBT/Supportive. Mood stable. Patient got his new hearing aids.  is experimenting with but is able to hear better when there is more noise around.  he also got the cream for his face that includes gabapentin and some other medications.  Has been using it the past several weeks and has noticed some improvement in pain intensity.  Plans on continued using.  Meeting with his  this morning and has some anxiety about his finances.  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  Went to [place] by himself and spent the day alone.  Has been feeling more alone and we discussed some things that he could do including online dating.  He reported that he is having difficulty putting himself out there at this point, self-conscious about his face.  Is going to the [employer] playoff game against [place] tomorrow.  Looking forward to that.  Discussed ways to deal with holiday loneliness including maintaining a structured schedule and engaging activities that he enjoys. Next: 3 weeks.

## 2025-01-08 ENCOUNTER — CLINICAL SUPPORT (OUTPATIENT)
Dept: AUDIOLOGY | Facility: CLINIC | Age: 65
End: 2025-01-08
Payer: COMMERCIAL

## 2025-01-08 ENCOUNTER — APPOINTMENT (OUTPATIENT)
Dept: AUDIOLOGY | Facility: CLINIC | Age: 65
End: 2025-01-08
Payer: COMMERCIAL

## 2025-01-08 DIAGNOSIS — H90.3 SENSORINEURAL HEARING LOSS (SNHL) OF BOTH EARS: Primary | ICD-10-CM

## 2025-01-10 ENCOUNTER — APPOINTMENT (OUTPATIENT)
Dept: BEHAVIORAL HEALTH | Facility: CLINIC | Age: 65
End: 2025-01-10
Payer: COMMERCIAL

## 2025-01-10 NOTE — PROGRESS NOTES
Name: Juan Miguel Ballesteros  YOB: 1960  Age: 65 y.o.    Date of Evaluation:  01/08/2025    History of Present Illness:  Juan Miguel Ballesteros , 65 y.o., was seen for a flex trial hearing aid fitting.  Juan Miguel Ballesteros has a history of bilateral tinnitus and hearing loss. He has a history of head and neck cancer with resection. Most recent hearing test from 12/25/2024 indicates a mild sensorineural hearing loss bilaterally. He is being fit with the below loaner hearing aids.     Phonak P90-R Flex Trial hearing aids  Right serial # [id number]  Left serial # [id number]    He is interested in purchasing his own pair of hearing aid.    Hearing Aid Check:  Insurance benefit will be verified. Once verified, Phonak Audeo I70 hearing aids will be ordered in P6 with size 2 receivers and open domes. Juan Miguel will be contacted when hearing aids arrive to schedule a hearing aid fitting. No charge as flex trial was billed at time of fitting and hearing aid will be billed to insurance on day of fitting.    Treatment Plan:  Follow-up in 2-3 weeks for a hearing aid fitting  Annual hearing test    Time: 6420-1684    Completed by:  Luz Diamond, CCC-A  Licensed Senior Audiologist

## 2025-01-22 ENCOUNTER — APPOINTMENT (OUTPATIENT)
Dept: AUDIOLOGY | Facility: CLINIC | Age: 65
End: 2025-01-22
Payer: COMMERCIAL

## 2025-01-31 ENCOUNTER — APPOINTMENT (OUTPATIENT)
Dept: BEHAVIORAL HEALTH | Facility: CLINIC | Age: 65
End: 2025-01-31
Payer: COMMERCIAL

## 2025-01-31 ENCOUNTER — OFFICE VISIT (OUTPATIENT)
Dept: BEHAVIORAL HEALTH | Facility: CLINIC | Age: 65
End: 2025-01-31
Payer: COMMERCIAL

## 2025-01-31 DIAGNOSIS — F32.0 CURRENT MILD EPISODE OF MAJOR DEPRESSIVE DISORDER WITHOUT PRIOR EPISODE (CMS-HCC): ICD-10-CM

## 2025-01-31 PROCEDURE — 90837 PSYTX W PT 60 MINUTES: CPT | Performed by: PSYCHOLOGIST

## 2025-02-01 NOTE — PROGRESS NOTES
dictated with Dragon 1 medical software  12 PM 30198 Indiv Psych for MDD (60 MIN, 1202-105).    In-personal.  CBT/Supportive. Mood stable. Patient not seen in the past few months.  He canceled his past few meetings.  He elected to go to the [employer] in [place] and the national championship college football ended [facility].  Spent much of the time discussing his experience.  Discussed some of the pitfalls and frustrations of the trip but overall trip was quite rewarding and enjoyable.  Was able to meet some of the team players on the [facility]'s chart picture with [employer].  Discussed how meaningful getting to games has been for him, having lots of social connections and enjoying this experience.  Hesitant about trying to get tickets this year with an elderly couple who gives him the right to purchase their season tickets but encouraged him to do so because of the immense enjoyment he gets from it.   He will consider this.  Was supposed to have a root canal this morning but did not because the tooth is fractured and the specialist encouraged him to wait it out until the tooth would need to be extracted.  Recently found out about the loss of another friend from cancer.  He often questions why he is still here when others with cancer, like his cousin, have .  Continues to try to practice cognitive reframing skills. Next: 3 weeks.

## 2025-02-14 PROBLEM — R68.89 SUSPECTED MALIGNANT NEOPLASM: Status: ACTIVE | Noted: 2025-02-14

## 2025-02-14 PROBLEM — Q67.4 CONGENITAL MUSCULOSKELETAL DEFORMITIES OF SKULL, FACE, AND JAW: Status: ACTIVE | Noted: 2025-02-14

## 2025-02-14 PROBLEM — M25.512 PAIN OF LEFT SHOULDER REGION: Status: ACTIVE | Noted: 2025-02-14

## 2025-02-14 PROBLEM — R29.898 WEAKNESS OF SHOULDER: Status: RESOLVED | Noted: 2023-08-27 | Resolved: 2025-02-14

## 2025-02-14 PROBLEM — Z71.89 COUNSELING REGARDING ADVANCE DIRECTIVES AND GOALS OF CARE: Status: ACTIVE | Noted: 2025-02-14

## 2025-02-14 PROBLEM — H04.201 RIGHT EPIPHORA: Status: ACTIVE | Noted: 2025-02-14

## 2025-02-14 PROBLEM — G51.8 OTHER DISORDERS OF FACIAL NERVE: Status: ACTIVE | Noted: 2025-02-14

## 2025-02-14 PROBLEM — F32.0 MAJOR DEPRESSIVE DISORDER, SINGLE EPISODE, MILD (CMS-HCC): Status: RESOLVED | Noted: 2023-08-27 | Resolved: 2025-02-14

## 2025-02-14 PROBLEM — H04.559 ACQUIRED NASOLACRIMAL DUCT OBSTRUCTION: Status: ACTIVE | Noted: 2025-02-14

## 2025-02-14 PROBLEM — H57.9 EYE SYMPTOMS: Status: ACTIVE | Noted: 2025-02-14

## 2025-02-14 PROBLEM — S11.90XA OPEN WOUND OF NECK: Status: ACTIVE | Noted: 2025-02-14

## 2025-02-14 PROBLEM — S68.119A AMPUTATION OF FINGER: Status: ACTIVE | Noted: 2025-02-14

## 2025-02-14 PROBLEM — H04.202 LEFT EPIPHORA: Status: ACTIVE | Noted: 2021-12-14

## 2025-02-14 PROBLEM — K59.00 CONSTIPATION: Status: RESOLVED | Noted: 2023-08-27 | Resolved: 2025-02-14

## 2025-02-14 PROBLEM — H91.90 HEARING LOSS: Status: ACTIVE | Noted: 2025-02-14

## 2025-02-14 PROBLEM — R51.9 FACIAL PAIN: Status: ACTIVE | Noted: 2025-02-14

## 2025-02-14 PROBLEM — C44.320 SQUAMOUS CELL CARCINOMA OF SKIN OF FACE: Status: ACTIVE | Noted: 2025-02-14

## 2025-02-14 PROBLEM — G24.4 ORAL DYSKINESIA: Status: ACTIVE | Noted: 2025-02-14

## 2025-02-14 PROBLEM — Z51.5 ENCOUNTER FOR PALLIATIVE CARE: Status: ACTIVE | Noted: 2025-02-14

## 2025-02-14 PROBLEM — H02.532 LOWER EYELID RETRACTION OF RIGHT EYE: Status: ACTIVE | Noted: 2022-02-08

## 2025-02-14 PROBLEM — F32.9 MAJOR DEPRESSIVE DISORDER: Status: ACTIVE | Noted: 2025-02-14

## 2025-02-14 PROBLEM — S11.90XA OPEN NECK WOUND: Status: ACTIVE | Noted: 2025-02-14

## 2025-02-14 PROBLEM — T14.8XXA LOCAL INFECTION OF WOUND: Status: ACTIVE | Noted: 2025-02-14

## 2025-02-14 PROBLEM — R22.0 MASS OF FACE: Status: ACTIVE | Noted: 2025-02-14

## 2025-02-14 PROBLEM — F41.1 GENERALIZED ANXIETY DISORDER: Status: ACTIVE | Noted: 2025-02-14

## 2025-02-14 PROBLEM — L08.9 LOCAL INFECTION OF WOUND: Status: ACTIVE | Noted: 2025-02-14

## 2025-02-14 PROBLEM — K21.9 GERD (GASTROESOPHAGEAL REFLUX DISEASE): Status: RESOLVED | Noted: 2023-08-27 | Resolved: 2025-02-14

## 2025-02-14 PROBLEM — R13.11 DYSPHAGIA, ORAL PHASE: Status: ACTIVE | Noted: 2025-02-14

## 2025-02-14 PROBLEM — R11.0 NAUSEA: Status: RESOLVED | Noted: 2023-08-27 | Resolved: 2025-02-14

## 2025-02-14 PROBLEM — R22.0 SWELLING OF FACE: Status: ACTIVE | Noted: 2025-02-14

## 2025-02-17 ENCOUNTER — OFFICE VISIT (OUTPATIENT)
Dept: BEHAVIORAL HEALTH | Facility: CLINIC | Age: 65
End: 2025-02-17
Payer: COMMERCIAL

## 2025-02-17 DIAGNOSIS — F32.0 MAJOR DEPRESSIVE DISORDER, SINGLE EPISODE, MILD (CMS-HCC): ICD-10-CM

## 2025-02-17 PROCEDURE — 99214 OFFICE O/P EST MOD 30 MIN: CPT | Performed by: PSYCHIATRY & NEUROLOGY

## 2025-02-17 PROCEDURE — 99214 OFFICE O/P EST MOD 30 MIN: CPT | Mod: AM | Performed by: PSYCHIATRY & NEUROLOGY

## 2025-02-17 PROCEDURE — 1036F TOBACCO NON-USER: CPT | Performed by: PSYCHIATRY & NEUROLOGY

## 2025-02-17 NOTE — PROGRESS NOTES
"Patient  Juan Miguel Ballesteros is a 65 y.o. male, presented for No chief complaint on file.  .    Referred by: Ricardo Becker MD  [id number]  Department Of [facility] [id number]       History of presenting Illness:     Patient reports existential angst. Tells me that he saw someone in the elevator who was worse than him and thought to himself that he does not have any right to be upset because there are people going through much worst. He then went on to relate with loss of his cousin and friends, how it makes him feel excessively guilty. \"I am trying to win more battles than I lose, I am trying.\" No change in depressive symptoms. Still sleeping well, energy and appetite are good. Denies suicidal ideas, intent or plans.       Past Medical History  Past Medical History:   Diagnosis Date    Chronic maxillary sinusitis 04/05/2022    Chronic maxillary sinusitis    Constipation 08/27/2023    GERD (gastroesophageal reflux disease) 08/27/2023    Localized swelling, mass and lump, head 06/16/2022    Facial swelling    Major depressive disorder, single episode, mild (CMS-HCC) 08/27/2023    Nausea 08/27/2023    Personal history of other malignant neoplasm of skin     History of other malignant neoplasm of skin    Personal history of other specified conditions 12/14/2021    History of nasal congestion    Sensorineural hearing loss, bilateral 11/29/2021    Sensorineural hearing loss (SNHL) of both ears    Squamous cell carcinoma of skin of other parts of face 10/11/2022    Squamous cell carcinoma of skin of orbital rim    Tinnitus, bilateral 11/29/2021    Tinnitus of both ears    Unspecified ectropion of unspecified eye, unspecified eyelid 06/16/2022    Ectropion    Unspecified epiphora, right side 08/18/2022    Epiphora of right side    Unspecified epiphora, unspecified side 04/11/2022    Epiphora    Weakness of shoulder 08/27/2023       Surgical History  Past Surgical History:   Procedure Laterality Date    OTHER " SURGICAL HISTORY  04/02/2021    Tonsillectomy        Family History:  Family History   Problem Relation Name Age of Onset    Uterine cancer Mother      Lymphoma Father         Social History:  Social History     Socioeconomic History    Marital status:      Spouse name: Not on file    Number of children: Not on file    Years of education: Not on file    Highest education level: Not on file   Occupational History    Not on file   Tobacco Use    Smoking status: Never    Smokeless tobacco: Never   Substance and Sexual Activity    Alcohol use: Never    Drug use: Never    Sexual activity: Defer   Other Topics Concern    Not on file   Social History Narrative    Not on file     Social Drivers of Health     Financial Resource Strain: Not on file   Food Insecurity: Not on file   Transportation Needs: Not on file   Physical Activity: Not on file   Stress: Not on file   Social Connections: Not on file   Intimate Partner Violence: Not on file   Housing Stability: Not on file         Medication:  Current Outpatient Medications   Medication Instructions    buPROPion SR (WELLBUTRIN SR) 150 mg, oral, Every morning    fluticasone (Flonase) 50 mcg/actuation nasal spray 2 sprays, Each Nostril, Daily, Shake gently. Before first use, prime pump. After use, clean tip and replace cap.    halobetasol propionate (Bryhali) 0.01 % lotion Bryhali ; 0 Lotion prn Quantity: 0 Refills: 0 Ordered: 8-Apr-2021 Gabriela Gillespie Generic Substitution Allowed    multivit,calc,min-FA-K1-lycop (One-A-Day Men's Complete) 240 mcg-30 mcg- 300 mcg tablet Take by mouth.    valACYclovir (Valtrex) 1 gram tablet TAKE TWO TABLETS BY MOUTH TWICE A DAY AT FIRST SYMPTOM ONSET        Allergies  .No Known Allergies     Vitals:  Visit Vitals  Smoking Status Never        Mental Status Exam  General: Appears stated age, dressed appropriately  Appearance: Fair hygiene and grooming.  Attitude: Pleasant  Behavior: Cooperative  Motor Activity: WNL  Speech: Soft,  "normal rate, rhythm and volume.  Mood: \"anxious\"  Affect: Congruent  Thought Process: Linear and goal directed  Thought Content: Denies suicidal or homicidal ideas, intent or plans. No IOR or delusions.  Thought Perception: No perceptual abnormalities.  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 Depression appears to be multifactorial, as his cancer recurred when he decided to take [facility]CF and has also been struggling financially. He was seen for a few months in 2022, was not amenable to antidepressant medication at the time and worked on CBT with some imprvement. HE was started on wellbutrin in 2023. He returns to clinic after 7-8 months. He is not engaged in therapy anymore. Continues to have anxiety and depressive symptoms, without significant functional impairment.      Discussed r/b/a of medication in detail, answered all his questions to his satisfaction.      Plan:  1. Major depressive disorder, recrrent, moderate; SIMONA  - Continue bupropion SR 150mg PO QAM  - Individual therapy, he continues with Dr. Miller  - No acute safety issues.  - Not interested in medication for sleep. Advised to discussed more frequent visits with Dr. Negron or referral to IOP.    Medication Consent  Medication " Consent: risks, benefits, side effects reviewed for all ordered meds    Please schedule a follow-up with your PCP for your ongoing medical problems.    Dr. Becker is in office on Mon- Thu. Phone calls may not be returned until next day I am in office.   For scheduling questions: [phone]  For other questions: [phone]       For [facility], [employer] is a 24/7 hotline that you can call for assistance: [phone].     Please call 9-1-1 or go to the nearest emergency room if you feel worse or have thoughts of hurting yourself or anyone else, or hearing voices, seeing visions or having new or scary thoughts about people around you.    I spent    40 minutes in the professional and overall care of this patient.    Ricardo Becker MD

## 2025-02-21 ENCOUNTER — APPOINTMENT (OUTPATIENT)
Dept: BEHAVIORAL HEALTH | Facility: CLINIC | Age: 65
End: 2025-02-21
Payer: COMMERCIAL

## 2025-02-21 DIAGNOSIS — F32.0 CURRENT MILD EPISODE OF MAJOR DEPRESSIVE DISORDER WITHOUT PRIOR EPISODE (CMS-HCC): ICD-10-CM

## 2025-02-21 PROCEDURE — 90837 PSYTX W PT 60 MINUTES: CPT | Performed by: PSYCHOLOGIST

## 2025-02-21 NOTE — PROGRESS NOTES
Dictated with Dragon One medical software  9 AM 18506 Indiv Psych for MDD (60 MIN, [phone]).    In-personal.  CBT/Supportive. Mood stable. Patient saw his psychiatrist this week.  Discussed an upsetting event when he ended up not paying attention and ended up going to [facility] versus [facility].  We discussed a constructive mindset.  Patient attended  of brothers father-in-law in [facility].  Was supposed to meet up with his brother and other family members after.  He got home and felt exhausted and did not want to go back out.  Tries to understand his feelings and why he might not want to go back out.  He ultimately ended up going and had a good time.  Discussed the importance of trying to identify the reasons for distress and trying to take a constructive response to this either through thinking differently or taking a constructive action.  Discussed the importance of learning from events like the 1 when he went out and did not think he would have a good time but did and utilizing this to inform future decisions.  Gets upset with him self when he does not understand why he is feeling off emotionally.  Patient has some positive plans today to spend time with his brother and he hopes he does not get derailed.  We also discussed pros and cons of getting [employer] take his for next year. Next: 3 weeks.

## 2025-02-28 ENCOUNTER — APPOINTMENT (OUTPATIENT)
Dept: AUDIOLOGY | Facility: CLINIC | Age: 65
End: 2025-02-28
Payer: COMMERCIAL

## 2025-03-10 ENCOUNTER — APPOINTMENT (OUTPATIENT)
Dept: AUDIOLOGY | Facility: CLINIC | Age: 65
End: 2025-03-10
Payer: COMMERCIAL

## 2025-03-10 DIAGNOSIS — H90.3 SENSORINEURAL HEARING LOSS (SNHL) OF BOTH EARS: Primary | ICD-10-CM

## 2025-03-10 PROCEDURE — V5160 DISPENSING FEE BINAURAL: HCPCS | Performed by: AUDIOLOGIST

## 2025-03-14 ENCOUNTER — APPOINTMENT (OUTPATIENT)
Dept: BEHAVIORAL HEALTH | Facility: CLINIC | Age: 65
End: 2025-03-14
Payer: COMMERCIAL

## 2025-03-14 DIAGNOSIS — F32.0 CURRENT MILD EPISODE OF MAJOR DEPRESSIVE DISORDER WITHOUT PRIOR EPISODE (CMS-HCC): ICD-10-CM

## 2025-03-14 PROCEDURE — 90837 PSYTX W PT 60 MINUTES: CPT | Performed by: PSYCHOLOGIST

## 2025-03-14 NOTE — PROGRESS NOTES
Dictated with Dragon One medical software  9 AM 31162 gopogoiv Psych for MDD, Single Episode (60 MIN, [phone]).    In-personal.  CBT/Supportive. Mood stable.  discussed some specific stressors he has recently had.  Discussed some problems that he is having his vehicle and trying to get that repaired.  Also discussed his hearing his neighbor, who has been rude to him, being loud and he anticipates the drinking season will start.  He is contemplating discussing this with another neighbor.  Recently got hearing aids and is hearing pretty well with those.  We discussed the importance of not adding insult to injury and getting upset with himself for being upset by events but figuring out a more self compassionate way to address himself.  He was able to secure football tickets for next year and is excited about that.  Is looking forward to the fish lamb season with lack and spending some time with family.  Anticipates he will see his neighbor out today when he goes home to do some yard work and this makes him anxious. Next: 3 weeks.

## 2025-03-19 NOTE — PROGRESS NOTES
Name: Juan Miguel Ballesteros  YOB: 1960  Age: 65 y.o.    Date of Evaluation:  01/08/2025    History of Present Illness:  Juan Miguel Ballesteros , 65 y.o., was seen for a hearing aid fitting.  Juan Miguel Ballesteros has a history of bilateral tinnitus and hearing loss. He has a history of head and neck cancer with resection. Most recent hearing test from 12/25/2024 indicates a mild sensorineural hearing loss bilaterally. He was  fit with [employer] hearing aids and decided to purchase his own pair.    Phonak L90-R hearing aids  Right serial # [id number]  Left serial # [id number]  Warranty expiration: 3/11/2028     Programming was completed at 100% of Phonak prescriptive fitting strategy. Program and volume control signals were demonstrated for the patient. Care and maintenance of the device were discussed with the patient. The patient was able to properly insert and remove the hearing instrument from the ear. In addition to today's verbal instruction of the hearing devices, the patient was given written instructions from the hearing aid . Hearing aid limitations were discussed at length as well as realistic expectations. The patient was advised in order to receive full benefit of amplification, consistent use during all waking hours is recommended.     The repair warranty and the conditions of the right-to-return period (30-days) were discussed. The patient reports understanding of these conditions. Purchase agreement will be signed once hearing aids are billed through insurance.    Hearing aid verification was completed with hearing aids at 100% targets using simulated REM. The hearing aids were verified to NAL-N1 targets using speech mapping for soft (55 dB), average (65 dB), and loud (75dB) speech as well as MPO. As patient has never worn a hearing aid for the right ear and has not worn one for the left ear for some time, gain was brought back down to 90% based on patient's comfort level. Counseled patient regarding  adaptation period.      Treatment Plan:  Follow-up as needed for hearing aid checks  Annual hearing test    Time: 1668-0918    Completed by:  Luz Diamond, CCC-A  Licensed Senior Audiologist

## 2025-03-21 ENCOUNTER — APPOINTMENT (OUTPATIENT)
Dept: AUDIOLOGY | Facility: CLINIC | Age: 65
End: 2025-03-21
Payer: COMMERCIAL

## 2025-04-04 ENCOUNTER — APPOINTMENT (OUTPATIENT)
Dept: BEHAVIORAL HEALTH | Facility: CLINIC | Age: 65
End: 2025-04-04
Payer: COMMERCIAL

## 2025-04-04 DIAGNOSIS — F32.0 CURRENT MILD EPISODE OF MAJOR DEPRESSIVE DISORDER WITHOUT PRIOR EPISODE (CMS-HCC): ICD-10-CM

## 2025-04-04 PROCEDURE — 90837 PSYTX W PT 60 MINUTES: CPT | Performed by: PSYCHOLOGIST

## 2025-04-05 NOTE — PROGRESS NOTES
"Dictated with Dragon One medical software  9 AM 90598 Indiv Psych for MDD, Single Episode (60 MIN, [phone]).    In-personal.  CBT/Supportive. Patient has not been feeling as well.  Discussed the situation with his neighbor who has had problems with in the past.  Came home recently and neighbor was out with multiple others in the yard and drinking.  He felt that the neighbor stared him down and had a shading green on his face.  Discussed the difficulty of his situation.  Patient does not want to call the place of the noise gets too loud.  However may go over and asked him to quiet down if it is late at night in the future.  Patient went to urgent care recently not feeling good and having high blood pressure.  Was given amoxicillin for 10 days.  \"Do not feel like myself.\"  Has been consistently getting to the gym.  On April 26 patient will have been 4 years post cancer surgery.  Continues to get out with family for fish Fridays during lab.  Still using compound facial cream for pain which is may be helping a little. Still trying to troubleshoot water leakage from under his car. Next: 3 weeks.  "

## 2025-04-14 ENCOUNTER — OFFICE VISIT (OUTPATIENT)
Dept: BEHAVIORAL HEALTH | Facility: CLINIC | Age: 65
End: 2025-04-14
Payer: COMMERCIAL

## 2025-04-14 VITALS
TEMPERATURE: 97.2 F | RESPIRATION RATE: 18 BRPM | OXYGEN SATURATION: 98 % | SYSTOLIC BLOOD PRESSURE: 131 MMHG | DIASTOLIC BLOOD PRESSURE: 78 MMHG | HEART RATE: 62 BPM | WEIGHT: 213.1 LBS | BODY MASS INDEX: 28.9 KG/M2

## 2025-04-14 DIAGNOSIS — F32.0 MAJOR DEPRESSIVE DISORDER, SINGLE EPISODE, MILD (CMS-HCC): ICD-10-CM

## 2025-04-14 PROBLEM — N52.9 ED (ERECTILE DYSFUNCTION) OF ORGANIC ORIGIN: Status: ACTIVE | Noted: 2025-04-14

## 2025-04-14 PROBLEM — E29.1 TESTICULAR HYPOFUNCTION: Status: ACTIVE | Noted: 2025-04-14

## 2025-04-14 PROBLEM — E04.1 NON-TOXIC UNINODULAR GOITER: Status: ACTIVE | Noted: 2025-04-14

## 2025-04-14 PROBLEM — J06.9 ACUTE UPPER RESPIRATORY INFECTION: Status: ACTIVE | Noted: 2025-04-14

## 2025-04-14 PROBLEM — N40.1 BENIGN PROSTATIC HYPERPLASIA WITH LOWER URINARY TRACT SYMPTOMS: Status: ACTIVE | Noted: 2025-04-14

## 2025-04-14 PROBLEM — B07.9 VIRAL WARTS: Status: ACTIVE | Noted: 2025-04-14

## 2025-04-14 PROBLEM — H43.813 PVD (POSTERIOR VITREOUS DETACHMENT), BOTH EYES: Status: ACTIVE | Noted: 2025-02-27

## 2025-04-14 PROBLEM — K57.32 DIVERTICULITIS OF COLON: Status: ACTIVE | Noted: 2025-04-14

## 2025-04-14 PROBLEM — R35.0 FREQUENCY OF MICTURITION: Status: ACTIVE | Noted: 2025-04-14

## 2025-04-14 PROBLEM — R10.84 GENERALIZED ABDOMINAL PAIN: Status: ACTIVE | Noted: 2025-04-14

## 2025-04-14 PROBLEM — R03.0 ELEVATED BLOOD-PRESSURE READING WITHOUT DIAGNOSIS OF HYPERTENSION: Status: ACTIVE | Noted: 2025-03-24

## 2025-04-14 PROBLEM — N39.0 URINARY TRACT INFECTIOUS DISEASE: Status: ACTIVE | Noted: 2025-04-14

## 2025-04-14 PROBLEM — J01.90 ACUTE SINUSITIS: Status: ACTIVE | Noted: 2025-03-24

## 2025-04-14 PROBLEM — R00.2 PALPITATIONS: Status: ACTIVE | Noted: 2025-04-14

## 2025-04-14 PROCEDURE — 99213 OFFICE O/P EST LOW 20 MIN: CPT | Mod: AM | Performed by: PSYCHIATRY & NEUROLOGY

## 2025-04-14 PROCEDURE — 1036F TOBACCO NON-USER: CPT | Performed by: PSYCHIATRY & NEUROLOGY

## 2025-04-14 PROCEDURE — 99213 OFFICE O/P EST LOW 20 MIN: CPT | Performed by: PSYCHIATRY & NEUROLOGY

## 2025-04-14 RX ORDER — AMOXICILLIN AND CLAVULANATE POTASSIUM 875; 125 MG/1; MG/1
TABLET, FILM COATED ORAL
COMMUNITY
Start: 2025-03-24

## 2025-04-14 RX ORDER — SILDENAFIL 50 MG/1
TABLET, FILM COATED ORAL EVERY 24 HOURS
COMMUNITY
Start: 2023-06-16

## 2025-04-14 ASSESSMENT — PAIN SCALES - GENERAL: PAINLEVEL_OUTOF10: 0-NO PAIN

## 2025-04-14 NOTE — PROGRESS NOTES
Patient  Juan Miguel Ballesteros is a 65 y.o. male, presented for   Chief Complaint   Patient presents with    Follow-up     Fuv    .      History of presenting Illness:   Patient reports existential angst. Apr 26th his 4 year anniversary from diagnosis and surgery is coming up. Tells me that he went to cemetery yesterday and cried a lot. Felt lighter after that. Mood and anxiety are the same. He has been in individual therapy. No change in depressive symptoms. Still sleeping well, energy and appetite are good. Denies suicidal ideas, intent or plans.   Past Medical History  Past Medical History:   Diagnosis Date    Chronic maxillary sinusitis 04/05/2022    Chronic maxillary sinusitis    Constipation 08/27/2023    GERD (gastroesophageal reflux disease) 08/27/2023    Localized swelling, mass and lump, head 06/16/2022    Facial swelling    Major depressive disorder, single episode, mild (CMS-HCC) 08/27/2023    Nausea 08/27/2023    Personal history of other malignant neoplasm of skin     History of other malignant neoplasm of skin    Personal history of other specified conditions 12/14/2021    History of nasal congestion    Sensorineural hearing loss, bilateral 11/29/2021    Sensorineural hearing loss (SNHL) of both ears    Squamous cell carcinoma of skin of other parts of face 10/11/2022    Squamous cell carcinoma of skin of orbital rim    Tinnitus, bilateral 11/29/2021    Tinnitus of both ears    Unspecified ectropion of unspecified eye, unspecified eyelid 06/16/2022    Ectropion    Unspecified epiphora, right side 08/18/2022    Epiphora of right side    Unspecified epiphora, unspecified side 04/11/2022    Epiphora    Weakness of shoulder 08/27/2023       Surgical History  Past Surgical History:   Procedure Laterality Date    OTHER SURGICAL HISTORY  04/02/2021    Tonsillectomy        Family History:  Family History   Problem Relation Name Age of Onset    Uterine cancer Mother      Lymphoma Father         Social History:  Social  History     Socioeconomic History    Marital status:      Spouse name: Not on file    Number of children: Not on file    Years of education: Not on file    Highest education level: Not on file   Occupational History    Not on file   Tobacco Use    Smoking status: Never    Smokeless tobacco: Never   Substance and Sexual Activity    Alcohol use: Never    Drug use: Never    Sexual activity: Defer   Other Topics Concern    Not on file   Social History Narrative    Not on file     Social Drivers of Health     Financial Resource Strain: Not on file   Food Insecurity: Not on file   Transportation Needs: Not on file   Physical Activity: Not on file   Stress: Not on file   Social Connections: Not on file   Intimate Partner Violence: Not on file   Housing Stability: Not on file         Medication:  Current Outpatient Medications   Medication Instructions    amoxicillin-pot clavulanate (Augmentin) 875-125 mg tablet TAKE 1 TABLET EVERY 12 HOURS BY ORAL ROUTE WITH MEALS FOR 10 DAYS, FOR SINUS INFECTION.    buPROPion SR (WELLBUTRIN SR) 150 mg, oral, Every morning    fluticasone (Flonase) 50 mcg/actuation nasal spray 2 sprays, Each Nostril, Daily, Shake gently. Before first use, prime pump. After use, clean tip and replace cap.    halobetasol propionate (Bryhali) 0.01 % lotion Bryhali ; 0 Lotion prn Quantity: 0 Refills: 0 Ordered: 8-Apr-2021 Gabriela Gillespie Generic Substitution Allowed    multivit,calc,min-FA-K1-lycop (One-A-Day Men's Complete) 240 mcg-30 mcg- 300 mcg tablet Take by mouth.    sildenafil (Viagra) 50 mg tablet Every 24 hours    valACYclovir (Valtrex) 1 gram tablet TAKE TWO TABLETS BY MOUTH TWICE A DAY AT FIRST SYMPTOM ONSET        Allergies  .No Known Allergies     Vitals:  Visit Vitals  /78   Pulse 62   Temp 36.2 °C (97.2 °F)   Resp 18   Wt 96.7 kg (213 lb 1.6 oz)   SpO2 98%   BMI 28.90 kg/m²   Smoking Status Never   BSA 2.22 m²        Mental Status Exam  General: Appears stated age, dressed  "appropriately  Appearance: Fair hygiene and grooming.  Attitude: Pleasant  Behavior: Cooperative  Motor Activity: WNL  Speech: Soft, normal rate, rhythm and volume.  Mood: \"same\"  Affect: Congruent  Thought Process: Linear and goal directed  Thought Content: Denies suicidal or homicidal ideas, intent or plans. No IOR or delusions.  Thought Perception: No perceptual abnormalities.  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 Depression appears to be multifactorial, as his cancer recurred when he decided to take senior care and has also been struggling financially. He was seen for a few months in 2022, was not amenable to antidepressant medication at the time and worked on CBT with some imprvement. HE was started on wellbutrin in 2023. He returns to clinic after 7-8 months. He is not engaged in therapy anymore. Continues to have anxiety and depressive symptoms, without significant functional impairment.      Discussed r/b/a of medication in detail, answered all his questions to his satisfaction.      Plan:  1. Major depressive disorder, recrrent, moderate; SIMONA  - Continue bupropion SR 150mg PO QAM  - Individual therapy, he continues with Dr. Miller  - No acute safety issues.  - Not interested in " medication for sleep. Advised to discussed more frequent visits with Dr. Negron or referral to IOP.    Medication Consent  Medication Consent: no medication changes necessary for review    Please schedule a follow-up with your PCP for your ongoing medical problems.    Dr. Becker is in office on Mon- Thu. Phone calls may not be returned until next day I am in office.   For scheduling questions: [phone]  For other questions: [phone]       For [facility], [employer] is a 24/7 hotline that you can call for assistance: [phone].     Please call 9-1-1 or go to the nearest emergency room if you feel worse or have thoughts of hurting yourself or anyone else, or hearing voices, seeing visions or having new or scary thoughts about people around you.    I spent    35 minutes in the professional and overall care of this patient.    Ricardo Becker MD

## 2025-04-22 ENCOUNTER — APPOINTMENT (OUTPATIENT)
Dept: OTOLARYNGOLOGY | Facility: CLINIC | Age: 65
End: 2025-04-22
Payer: COMMERCIAL

## 2025-04-22 VITALS — WEIGHT: 210 LBS | HEIGHT: 72 IN | BODY MASS INDEX: 28.44 KG/M2

## 2025-04-22 DIAGNOSIS — R51.9 FACIAL PAIN: ICD-10-CM

## 2025-04-22 DIAGNOSIS — G51.9 ABNORMALITY OF FACIAL NERVE: ICD-10-CM

## 2025-04-22 DIAGNOSIS — R22.0 FACIAL SWELLING: ICD-10-CM

## 2025-04-22 DIAGNOSIS — R04.0 EPISTAXIS: Primary | ICD-10-CM

## 2025-04-22 DIAGNOSIS — C44.320 SQUAMOUS CELL CARCINOMA OF SKIN OF FACE: ICD-10-CM

## 2025-04-22 NOTE — PROGRESS NOTES
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 The tumor tracted along the infraorbital nerve all the way to the foramen rotundum. The last margin was clear.      Right orbital rim SCC had this resected by an outside provider in 9/2020.  At the time of resection, he also had a septoplasty and cryotherapy performed for rhinorrhea.      Recalls having multiple prior skin cancers on the face as well that was treated with liquid chemotherapy in the past and believes he may have had a prior skin lesion over his current cancer.       Patient is here for follow-up visit    Continues to have some nosebleeds on the right side    Trying to manage his pain and the facial spasms with topical agents and is working with Dr. Jones      Also with some decreased hearing on the right-hand side he has fluid there    Uses nasal sprays        Physical Exam:  CONSTITUTIONAL:  No acute distress  VOICE:  No hoarseness or other abnormality  RESPIRATION:  Breathing comfortably, no stridor  CV:  No clubbing/cyanosis/edema in hands  EYES:  EOM intact, sclera normal  NEURO:  Alert and oriented times 3, Cranial nerves II-XII grossly intact and symmetric bilaterally  HEAD AND FACE:  mild asymmetric facial features, no masses or lesions, sinuses non-tender to palpation  SALIVARY GLANDS:  Parotid and submandibular glands normal bilaterally  EARS:  Normal external ears, external auditory canals, and TMs to otoscopy, normal hearing to whispered voice.  NOSE:  External nose midline, anterior rhinoscopy is normal with limited visualization to the anterior aspect of the interior turbinates, desiccated nasal septum visible vessel noted on the right  ORAL CAVITY/OROPHARYNX/LIPS:  Normal mucous membranes, normal floor of mouth/tongue/OP, no masses or lesions  PHARYNGEAL WALLS:  No masses or lesions  NECK/LYMPH:  No LAD, no thyroid masses, trachea midline  SKIN:  Neck skin is without scar or  injury  PSYCH:  Alert and oriented with appropriate mood and affect        Procedure not: control right epistaxis  After informed verbal consent a cottonoid soaked in mixture of Major-Synephrine lidocaine was placed into the nasal vestibule it was gently advanced more posteriorly bringing into view the anterior septum in question gentle brisk stroke with a cotton-tipped applicator in the area in question showed brisk hemorrhage which was separately controlled with multiple applications of silver nitrate rechallenge with Q-tip showed adequate control pledget removed area was dressed with light coating of bacitracin patient tolerated procedure well            CONNOR    Patient with some epistaxis we discussed methods of management including hygiene and potential for cauterization again if the above mention procedure does not fully control it      Will also reimage him given the symptoms and given his history of perineural invasion      I will see him back after the scan is available

## 2025-04-24 ASSESSMENT — ENCOUNTER SYMPTOMS
NUMBNESS: 0
ARTHRALGIAS: 0
FEVER: 0
NAUSEA: 0
HEADACHES: 0
COUGH: 0
SHORTNESS OF BREATH: 0
CONSTIPATION: 0
TROUBLE SWALLOWING: 0
LIGHT-HEADEDNESS: 0
ABDOMINAL PAIN: 0
VOMITING: 0
CHILLS: 0
LEG SWELLING: 0
DIARRHEA: 0
SORE THROAT: 0

## 2025-04-24 NOTE — PROGRESS NOTES
Patient ID: Juan Miguel Ballesteros is a 65 y.o. male.  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 Edward Pennington  [facility]: Dr. Kyunghee Burkitt  [facility]: Dr. Jose Alberto May    Prior Therapy  6/16 - 7/27/21: Received 2 cycles of HD Cisplatin (100mg/m2), 3rd  dose held d/t neutropenia.  Right sinus protons 50 gy in 25 fx. Boost protons 10 gy in 5 fx     Surgery  4/26/21: Lateral rhinotomy with excision of right facial soft tissue mass, partial maxillectomy. Right submandibular gland excision with neck exploration for vessels  12/27/21:  Right medial maxillectomy, left nasal endoscopy with lysis of adhesion,  right inferior turbinate resection, left inferior turbinate reduction, outfracture.   2/20/22: Fat grafting to the right midface and periorbita  6/23/22: Bilateral Arvizu tube placement    Current Oncologic Issues  Epiphora  Facial Neuropathy    ONCOLOGIC HISTORY  -  8/2020:  he started feeling a lump near the medial aspect of his right orbital rim. This was painful and it was causing him excruciating discomfort on palpation.   - 10/1/2020: S/p resection of right lower eyelid lesion, pathology showed poorly differentiated SCC.    - 10/2/20 [facility] Tumor Board: Presented with Right orbital mass, enlarging and painful, underwent local resection at outside facility with findings stated to be poorly differentiated squamous cell carcinoma, presents for further treatment recommendations.  Rec:  Further treatment recommendations pending pathology review  - 11/6/2020: PET, MRI showed no lesions.  Primary lesion is not clear.   - 3/2021: Recurrent disease in right orbital rim  - 4/2/21: MRI face showed abnormal enhancing soft tissue mass in an infraorbital location along the right cheek measuring  approximately 18 mm x 10 mm in transaxial dimensions by 20 mm in craniocaudal dimension.   -  4/26/21: resection of a large  subcutaneous squamous cell carcinoma of the right suborbital region, pathology showed poorly differentiated SCC (1.7cm), was found to have this ill-defined lesion with positive perineural extension. The infraorbital nerve  was tracked back all the way into the foramen rotundum. The final margin on the nerve was negative. He was presented at our tumor board and it was felt that he should undergo a combination of radiation and chemotherapy.   - 5/21/21 [facility] tumor Board History of right orbital rim squamous cell carcinoma S/P resection (2020), with pain and numbness with recurrent mass, underwent surgical resection, findings c/w cutaneous squamous cell carcinoma, Stage: T3 N0 M0 Rec: Radiation  +/- chemotherapy  - 6/16 - 7/27/21 : Received 2 cycles of high dose cisplatin, 3rd dose not given due to neutropenia. Right sinus protons 50 gy in 25 fx. Boost protons 10  gy in 5 fx  - 10/28/21: MRI orbit showed CONNOR.   - 4/7/23 [facility]: Reviewed 3/30/23 MRI Face, demonstrates post op changes and artifact noted. Asymmetrical  enhancement of V2 on right side.  No cavernous sinus involvement noted. Nothing noted on physical exam. Rec: Watchful waiting, repeat imaging and follow patient symptoms.      Past Medical History:   Past Medical History:  04/05/2022: Chronic maxillary sinusitis      Comment:  Chronic maxillary sinusitis  08/27/2023: Constipation  08/27/2023: GERD (gastroesophageal reflux disease)  06/16/2022: Localized swelling, mass and lump, head      Comment:  Facial swelling  08/27/2023: Major depressive disorder, single episode, mild (CMS-HCC)  08/27/2023: Nausea  No date: Personal history of other malignant neoplasm of skin      Comment:  History of other malignant neoplasm of skin  12/14/2021: Personal history of other specified conditions      Comment:  History of nasal congestion  11/29/2021: Sensorineural hearing loss, bilateral      Comment:  Sensorineural hearing loss (SNHL) of both ears  10/11/2022: Squamous cell  carcinoma of skin of other parts of face      Comment:  Squamous cell carcinoma of skin of orbital rim  11/29/2021: Tinnitus, bilateral      Comment:  Tinnitus of both ears  06/16/2022: Unspecified ectropion of unspecified eye, unspecified   eyelid      Comment:  Ectropion  08/18/2022: Unspecified epiphora, right side      Comment:  Epiphora of right side  04/11/2022: Unspecified epiphora, unspecified side      Comment:  Epiphora  08/27/2023: Weakness of shoulder   Surgical History:    Past Surgical History:   Procedure Laterality Date    OTHER SURGICAL HISTORY  04/02/2021    Tonsillectomy      Family History:    Family History   Problem Relation Name Age of Onset    Uterine cancer Mother      Lymphoma Father       Family Oncology History:    Cancer-related family history includes Lymphoma in his father; Uterine cancer in his mother.  Social History:    Social History     Tobacco Use    Smoking status: Never    Smokeless tobacco: Never   Substance Use Topics    Alcohol use: Never    Drug use: Never      Subjective   Juan Miguel Ballesteros is a 65 y.o. male with h/o right orbital SCC in 8/2020, s/p resection, but unfortunately recurred in 3/2021, s/p resection with findings of extensive perineural involvement. S/p concurrent chemoradiation  with HD Cisplatin x2, protons 50 gy in 25 fx,  boost protons 10 gy in 5 fx, completed 7/27/21.    Interval History   Patient presents today for 3 year 9 months follow up, he reports the following:    Patient reports feeling at his baseline.   He had been having nose bleed and had silver nitrate applied by ENT to address the nosebleeds last week.   Still has right facial numbness/neuropathic pain that comes and goes. There is no pattern to the onset of these right facial pains. Right upper lip remain numb. He met w/ Neurology and is applying a compounded cream which is helping.  He wasn't interested in starting oral medications for the right facial pain or any further surgery.  Otherwise,  appetite is good, physically he's feeling well, eating well and continues to go to the [employer] [facility] for group workouts 2-3 times a week.   Following with dentist Q6 month.     ROS  Review of Systems   Constitutional:  Negative for chills and fever.   HENT:   Negative for hearing loss, lump/mass, mouth sores, nosebleeds, sore throat, tinnitus and trouble swallowing.         Right facial pain/neuropathy   Respiratory:  Negative for cough and shortness of breath.    Cardiovascular:  Negative for chest pain and leg swelling.   Gastrointestinal:  Negative for abdominal pain, constipation, diarrhea, nausea and vomiting.   Musculoskeletal:  Negative for arthralgias.   Skin:  Negative for rash.   Neurological:  Negative for headaches, light-headedness and numbness.     Allergies  No Known Allergies     Medications  Current Outpatient Medications   Medication Instructions    buPROPion SR (WELLBUTRIN SR) 150 mg, oral, Every morning    fluticasone (Flonase) 50 mcg/actuation nasal spray 2 sprays, Each Nostril, Daily, Shake gently. Before first use, prime pump. After use, clean tip and replace cap.    halobetasol propionate (Bryhali) 0.01 % lotion Bryhali ; 0 Lotion prn Quantity: 0 Refills: 0 Ordered: 8-Apr-2021 Gabriela Gillespie Generic Substitution Allowed    multivit,calc,min-FA-K1-lycop (One-A-Day Men's Complete) 240 mcg-30 mcg- 300 mcg tablet Take by mouth.    sildenafil (Viagra) 50 mg tablet Every 24 hours    valACYclovir (Valtrex) 1 gram tablet TAKE TWO TABLETS BY MOUTH TWICE A DAY AT FIRST SYMPTOM ONSET      Objective   VS: /75   Pulse 74   Temp 36.2 °C (97.2 °F) (Core)   Resp 20   Wt 93.1 kg (205 lb 4 oz)   SpO2 99%   BMI 27.84 kg/m²   Weight: Daily Weight  04/30/25 : 93.1 kg (205 lb 4 oz)  04/22/25 : 95.3 kg (210 lb)  04/14/25 : 96.7 kg (213 lb 1.6 oz)  12/10/24 : 96.6 kg (213 lb)  11/18/24 : 97.4 kg (214 lb 12.8 oz)  10/29/24 : 96.1 kg (211 lb 13.8 oz)  10/22/24 : 96.2 kg (212 lb)    Physical  Exam  Constitutional:       Appearance: Normal appearance. He is well-developed.   HENT:      Head: Normocephalic and atraumatic.      Comments: S/p right lateral rhinotomy. Mild erythema in right cheek, area of hardness likely fat pad     Right Ear: External ear normal. No tenderness.      Left Ear: External ear normal. No tenderness.      Nose: Nose normal.      Mouth/Throat:      Mouth: No injury or oral lesions.      Tongue: No lesions.   Eyes:      Extraocular Movements: Extraocular movements intact.      Conjunctiva/sclera: Conjunctivae normal.      Pupils: Pupils are equal, round, and reactive to light.   Neck:      Thyroid: No thyroid mass.   Abdominal:      General: There is no abdominal bruit.   Musculoskeletal:         General: Normal range of motion.      Cervical back: Normal range of motion and neck supple. No signs of trauma. Normal range of motion.   Lymphadenopathy:      Upper Body:      Right upper body: No axillary adenopathy.      Left upper body: No axillary adenopathy.   Skin:     General: Skin is warm and dry.      Comments: No new skin lesions   Neurological:      General: No focal deficit present.      Mental Status: He is alert and oriented to person, place, and time.      Gait: Gait is intact.   Psychiatric:         Mood and Affect: Mood and affect normal.         Behavior: Behavior is cooperative.       Diagnostic Results   Labs  Below labs are reviewed today.  Lab Results   Component Value Date    WBC 3.7 (L) 04/30/2025    HGB 14.4 04/30/2025    HCT 44.0 04/30/2025    MCV 93 04/30/2025     04/30/2025      Lab Results   Component Value Date    NEUTROABS 2.01 04/30/2025    EOSABS 0.08 04/30/2025      Lab Results   Component Value Date    GLUCOSE 101 (H) 04/30/2025    CALCIUM 9.8 04/30/2025     04/30/2025    K 4.7 04/30/2025    CO2 30 04/30/2025     04/30/2025    BUN 15 04/30/2025    CREATININE 1.03 04/30/2025    MG 1.90 09/16/2022    PHOS 3.9 04/30/2021    ALBUMIN 4.5  "04/30/2025    PROT 7.4 04/30/2025     Lab Results   Component Value Date    ALT 28 04/30/2025    AST 27 04/30/2025    ALKPHOS 43 04/30/2025    BILITOT 1.1 04/30/2025      Lab Results   Component Value Date    TSH 0.66 04/30/2025    FREET4 1.15 10/29/2021     No results found for: \"RETICCTPCT\", \"RETIC\", \"IMMRETICFR\", \"RETICHGB\"  Lab Results   Component Value Date    IRON 62 03/17/2022               Images  10/30/24 MRI Brain MRI Face w/wo contrast  1. Stable posttherapy changes with associated abnormal enhancement extending to the skull base, no evidence of progressive disease.   2. No acute intracranial pathology or evidence of intracranial metastatic disease     Pathology    Assessment/Plan   ASSESSMENT  Juan Miguel Ballesteros is a 65 y.o. male with right orbital SCC in 8/2020, s/p resection, but unfortunately recurred in 3/2021,  s/p resection with findings of extensive perineural involvement. S/p concurrent chemoradiation with q3 week cisplatin (6/16-7/27/21).     4/30/25: Patient is 3 year 9 months from last treatment and is doing well overall. 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 Right facial pain responded to massage and acupuncture but did not have lasting relief. Now applying a compounded cream which provides temporary relief. Patient is becoming used to the pain and it is not affecting daily activities. Other chronic SE from prior treatments include xerostomia and epiphora are largely unchanged and well managed.     # Recurrent right orbital SCC  - S/p repeat resection and concurrent CRT w/ Q3week HD Cisplatin x 2, last cycle held d/t neutropenia. Last RT 7/27/21.   - 10/28/21: MRI orbit showed CONNOR.  - 11/8/21: CT facial bone showed notable right sided sinus disease  (soft tissue opacification of the right maxillary sinus, extending anteromedially to the surgical defect/at the site of the excision of the previously seen mass in the right infraorbital region/cheek region and extending " posteriorly into the retromaxillary  fat, sphenopalatine fossa).  - 12/27/21 Right medial maxillectomy; Left nasal endoscopy with lysis of adhesion; Right inferior turbinate  resection n4;  Left inferior turbinate reduction-  - 4/1/23 MRI Face notes thickening and enhancement of V2, unclear if this is disease recurrence vs post radiation changes, will monitor with MRI Face Q4 months (per Dr. Cohn).  - 5/23/23 biopsy of the right cheek did not reveal malignant cells. Palpable mass could correspond to the fat graft.   - 8/22/24 Mri Brain and face showed Soft tissue enhancement within the right pterygopalatine fossa. Can not exclude subtle postcontrast enhancement of the right V2 segment of the trigeminal nerve within foramen rotundum. No pathologic enhancement or mass lesion in the region of Meckel's cave. Scans were reviewed at  and felt to be treatment related changes.   - Continue follow up w/ ENT, Ophthalmology, and Q6 mo follow up w/ [facility], labs cbc, cmp, tsh/t4 on 10/29/25    # Right facial neuropathy  - S/p multiple surgeries w/o improvement in neuropathy. Continue compound cream topically.     # Epiphora  - Right epiphora started Sept 2021, left epiphora started Jan 2022.  - 2/20/22: Fat Grafting to the right midface and periorbita. Pt reports did not have symptom improvement after this procedure  - 6/23/22: Bilateral probing with Arvizu tube placement. Right lower eyelid cicatricial ectropion repair  - Still has persistent epiphora of bilateral eyes, L>R, will continue to monitor.      # MDD  -  Since retiring at end of 2020, daily routine has been disrupted. Reports feeling tired with low motivation to get out of bed, especially pronounced in the mornings. However once he's able to get up his energy is good.  - Still going to his [employer] classes and exercising 1-2 x/week. Recently completed a 5K run  - 2/13/23 Started on Wellbutrin XL 150mg   - Established w/ Onco Psych - Dr. Becker, continue follow  up.

## 2025-04-25 ENCOUNTER — APPOINTMENT (OUTPATIENT)
Dept: BEHAVIORAL HEALTH | Facility: CLINIC | Age: 65
End: 2025-04-25
Payer: COMMERCIAL

## 2025-04-30 ENCOUNTER — OFFICE VISIT (OUTPATIENT)
Dept: HEMATOLOGY/ONCOLOGY | Facility: HOSPITAL | Age: 65
End: 2025-04-30
Payer: COMMERCIAL

## 2025-04-30 ENCOUNTER — LAB (OUTPATIENT)
Dept: LAB | Facility: HOSPITAL | Age: 65
End: 2025-04-30
Payer: COMMERCIAL

## 2025-04-30 VITALS
DIASTOLIC BLOOD PRESSURE: 75 MMHG | TEMPERATURE: 97.2 F | HEART RATE: 74 BPM | WEIGHT: 205.25 LBS | RESPIRATION RATE: 20 BRPM | OXYGEN SATURATION: 99 % | SYSTOLIC BLOOD PRESSURE: 129 MMHG | BODY MASS INDEX: 27.84 KG/M2

## 2025-04-30 DIAGNOSIS — H04.201 EPIPHORA, RIGHT: ICD-10-CM

## 2025-04-30 DIAGNOSIS — Z08 ENCOUNTER FOR FOLLOW-UP SURVEILLANCE OF HEAD AND NECK CANCER: ICD-10-CM
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
## 2025-04-30 DIAGNOSIS — K11.7 XEROSTOMIA DUE TO RADIOTHERAPY: ICD-10-CM

## 2025-04-30 DIAGNOSIS — C44.329 SQUAMOUS CELL CANCER OF SKIN OF RIGHT CHEEK: ICD-10-CM

## 2025-04-30 DIAGNOSIS — Z85.89 ENCOUNTER FOR FOLLOW-UP SURVEILLANCE OF HEAD AND NECK CANCER: ICD-10-CM

## 2025-04-30 DIAGNOSIS — G51.9 FACIAL NEUROPATHY: ICD-10-CM

## 2025-04-30 DIAGNOSIS — S04.51XD: ICD-10-CM

## 2025-04-30 DIAGNOSIS — C44.329 SQUAMOUS CELL CANCER OF SKIN OF RIGHT CHEEK: Primary | ICD-10-CM

## 2025-04-30 LAB
ALBUMIN SERPL BCP-MCNC: 4.5 G/DL (ref 3.4–5)
ALP SERPL-CCNC: 43 U/L (ref 33–136)
ALT SERPL W P-5'-P-CCNC: 28 U/L (ref 10–52)
ANION GAP SERPL CALC-SCNC: 11 MMOL/L (ref 10–20)
AST SERPL W P-5'-P-CCNC: 27 U/L (ref 9–39)
BASOPHILS # BLD AUTO: 0.02 X10*3/UL (ref 0–0.1)
BASOPHILS NFR BLD AUTO: 0.5 %
BILIRUB SERPL-MCNC: 1.1 MG/DL (ref 0–1.2)
BUN SERPL-MCNC: 15 MG/DL (ref 6–23)
CALCIUM SERPL-MCNC: 9.8 MG/DL (ref 8.6–10.3)
CHLORIDE SERPL-SCNC: 104 MMOL/L (ref 98–107)
CO2 SERPL-SCNC: 30 MMOL/L (ref 21–32)
CREAT SERPL-MCNC: 1.03 MG/DL (ref 0.5–1.3)
EGFRCR SERPLBLD CKD-EPI 2021: 81 ML/MIN/1.73M*2
EOSINOPHIL # BLD AUTO: 0.08 X10*3/UL (ref 0–0.7)
EOSINOPHIL NFR BLD AUTO: 2.2 %
ERYTHROCYTE [DISTWIDTH] IN BLOOD BY AUTOMATED COUNT: 12.9 % (ref 11.5–14.5)
GLUCOSE SERPL-MCNC: 101 MG/DL (ref 74–99)
HCT VFR BLD AUTO: 44 % (ref 41–52)
HGB BLD-MCNC: 14.4 G/DL (ref 13.5–17.5)
IMM GRANULOCYTES # BLD AUTO: 0.01 X10*3/UL (ref 0–0.7)
IMM GRANULOCYTES NFR BLD AUTO: 0.3 % (ref 0–0.9)
LYMPHOCYTES # BLD AUTO: 1.07 X10*3/UL (ref 1.2–4.8)
LYMPHOCYTES NFR BLD AUTO: 28.9 %
MCH RBC QN AUTO: 30.4 PG (ref 26–34)
MCHC RBC AUTO-ENTMCNC: 32.7 G/DL (ref 32–36)
MCV RBC AUTO: 93 FL (ref 80–100)
MONOCYTES # BLD AUTO: 0.51 X10*3/UL (ref 0.1–1)
MONOCYTES NFR BLD AUTO: 13.8 %
NEUTROPHILS # BLD AUTO: 2.01 X10*3/UL (ref 1.2–7.7)
NEUTROPHILS NFR BLD AUTO: 54.3 %
NRBC BLD-RTO: 0 /100 WBCS (ref 0–0)
PLATELET # BLD AUTO: 208 X10*3/UL (ref 150–450)
POTASSIUM SERPL-SCNC: 4.7 MMOL/L (ref 3.5–5.3)
PROT SERPL-MCNC: 7.4 G/DL (ref 6.4–8.2)
RBC # BLD AUTO: 4.73 X10*6/UL (ref 4.5–5.9)
SODIUM SERPL-SCNC: 140 MMOL/L (ref 136–145)
TSH SERPL-ACNC: 0.66 MIU/L (ref 0.44–3.98)
WBC # BLD AUTO: 3.7 X10*3/UL (ref 4.4–11.3)

## 2025-04-30 PROCEDURE — 1036F TOBACCO NON-USER: CPT | Performed by: STUDENT IN AN ORGANIZED HEALTH CARE EDUCATION/TRAINING PROGRAM

## 2025-04-30 PROCEDURE — 85025 COMPLETE CBC W/AUTO DIFF WBC: CPT

## 2025-04-30 PROCEDURE — 36415 COLL VENOUS BLD VENIPUNCTURE: CPT

## 2025-04-30 PROCEDURE — 84443 ASSAY THYROID STIM HORMONE: CPT

## 2025-04-30 PROCEDURE — 99215 OFFICE O/P EST HI 40 MIN: CPT | Performed by: STUDENT IN AN ORGANIZED HEALTH CARE EDUCATION/TRAINING PROGRAM

## 2025-04-30 PROCEDURE — 80053 COMPREHEN METABOLIC PANEL: CPT

## 2025-04-30 ASSESSMENT — PAIN SCALES - GENERAL: PAINLEVEL_OUTOF10: 0-NO PAIN

## 2025-04-30 NOTE — PROGRESS NOTES
Patient is here for a FUV with Ana Maria Crooks PA-C. He has been doing well. Ana Maria is aware.

## 2025-05-16 ENCOUNTER — APPOINTMENT (OUTPATIENT)
Dept: BEHAVIORAL HEALTH | Facility: CLINIC | Age: 65
End: 2025-05-16
Payer: COMMERCIAL

## 2025-05-16 DIAGNOSIS — F32.0 CURRENT MILD EPISODE OF MAJOR DEPRESSIVE DISORDER WITHOUT PRIOR EPISODE: ICD-10-CM

## 2025-05-16 PROCEDURE — 90837 PSYTX W PT 60 MINUTES: CPT | Performed by: PSYCHOLOGIST

## 2025-05-16 NOTE — PROGRESS NOTES
Dictated with Dragon One medical software  8 AM 22064 [facility] for MDD, Single Episode (60 MIN, [id number]).    In-personal.  CBT/Supportive.  Patient has been feeling sick the past few weeks, GI distress. his white blood cell count has been down and he will get reevaluated in a month.  Has not had more difficulty sleeping.  Has not been getting to the gym as much because of being sick we will get back to that today.  Was able to get his [employer] football take this for next year we discussed some frustrations regarding this.  Still dealing with his car.  Discussed neighbor who has had some parties but no significant events. Next: 3 weeks.

## 2025-06-06 ENCOUNTER — APPOINTMENT (OUTPATIENT)
Dept: BEHAVIORAL HEALTH | Facility: CLINIC | Age: 65
End: 2025-06-06
Payer: COMMERCIAL

## 2025-06-06 DIAGNOSIS — F32.0 CURRENT MILD EPISODE OF MAJOR DEPRESSIVE DISORDER WITHOUT PRIOR EPISODE: ICD-10-CM

## 2025-06-06 PROCEDURE — 90837 PSYTX W PT 60 MINUTES: CPT | Performed by: PSYCHOLOGIST

## 2025-06-06 NOTE — PROGRESS NOTES
"Dictated with Dragon One medical software  9 AM [id number] [facility] for MDD (60 MIN, [phone]).    In-personal.  CBT/Supportive. Mood stable.  Patient reported he is out of town for the past 3 weeks.  Difficulties primarily stems from problems with his neighbor.  Memorial Day weekend neighbor had a gathering out in his yard until 1:30 in the morning.  Patient went out then and ask if they could keep it down, \"I am at the end of my rope.\"  The neighbor really responded that he could keep it down.  Patient reported that neighbors had gatherings every night this past week, difficulty managing this emotionally.  It resulted his not sleeping well and he has a difficult next day because of this.  Discussed actions he could take and he is planning on going to the [facility] today and possibly consulting an .  Spent entire session discussing this and his frustrations and managing.    Patient has had a history with this neighbor and neighbor has been rude and called the police on him before, lying about things he says patient did.  Accused him of shouting obscenities at him from his roof top and contacted the police.  He also accused patient of hitting him with a shovel to the police.  This usually occurs when neighbor has been drinking.  He also said something very rude and embarrassing the patient that stung the patient 1 day after his neighbor accused him of menacing.  Neighbor also told him that the police were keeping a file on him.   Patient is worried the situation will get out of control. Plans on doing a 5K tomorrow with his nieces. Next: 3 weeks.  "

## 2025-06-23 NOTE — PROGRESS NOTES
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 The tumor tracted along the infraorbital nerve all the way to the foramen rotundum. The last margin was clear. There was significant perineural invasion. He completed a combination of chemotherapy and radiation.  There is no obvious evidence of tumor recurrence.       I will see him in 6 months.      Chief Complaint     Follow-up regarding the management of a squamous cell carcinoma of the soft tissue around the right orbit      History of Present Illness    This gentleman was seen at the request of a local colleague. In August 2020 he started feeling a lump near the medial aspect of his right orbital rim. This was painful and it was causing his excruciating discomfort on palpation. This was removed and surprisingly showed up as a poorly differentiated squamous cell carcinoma. He was presented at our tumor board and the origin of this lesion was not explained. I did send him for a PET scan as well as an MRI of the area. I personally reviewed both studies that were done in November 2020 and they were also read by the radiologist and no lesion could be identified. I saw him in late March 2021 with an obvious clinical recurrence. On April 26, 2021 he was brought to surgery. He was found to have this ill-defined lesion that extended in every direction and also had significant perineural extension. The infraorbital nerve was tracked back all the way into the pterygomaxillary fossa. The final margin on the nerve was negative. He was presented at our tumor board and it was felt that he should undergo a combination of radiation and chemotherapy. This was completed at the end of July 2021. He had an MRI done in August 2024 which was negative for anything worrisome.  He is scheduled to have another 1 in the near future.  He had a TSH level in April 2025 which was normal. His last chest x-ray was in  September 2023 and was normal.  He continues to have some discomfort on the right side of his face.  This was not addressed with him.    Physical Exam    Examination of the face and neck does not show any palpable masses. There is good eye mobility. The anterior nasal exam shows a little bit of crusting but very minimal. His right nostril is definitely smaller than the left. Palpation of the parotid, neck, thyroid feel fails to show any worrisome masses or adenopathies. The ear examination is negative.  He does have hearing aids.  The intraoral exam is also negative. His upper lip has a tendency to roll in on the right.     A nasal endoscopy was carried out. Under topical Xylocaine and Major-Synephrine the scope was introduced through both nostrils. On the right side of the nose he does have this  cavity that has almost no crusting.  There is no evidence of any tumor recurrence.

## 2025-06-24 ENCOUNTER — APPOINTMENT (OUTPATIENT)
Facility: CLINIC | Age: 65
End: 2025-06-24
Payer: COMMERCIAL

## 2025-06-24 VITALS — HEIGHT: 72 IN | WEIGHT: 202 LBS | BODY MASS INDEX: 27.36 KG/M2

## 2025-06-24 DIAGNOSIS — C44.320 SQUAMOUS CELL CARCINOMA, FACE: Primary | ICD-10-CM

## 2025-06-24 PROCEDURE — 3008F BODY MASS INDEX DOCD: CPT | Performed by: OTOLARYNGOLOGY

## 2025-06-24 PROCEDURE — 99213 OFFICE O/P EST LOW 20 MIN: CPT | Performed by: OTOLARYNGOLOGY

## 2025-06-24 PROCEDURE — 31231 NASAL ENDOSCOPY DX: CPT | Performed by: OTOLARYNGOLOGY

## 2025-06-24 PROCEDURE — 1036F TOBACCO NON-USER: CPT | Performed by: OTOLARYNGOLOGY

## 2025-06-27 ENCOUNTER — APPOINTMENT (OUTPATIENT)
Dept: BEHAVIORAL HEALTH | Facility: CLINIC | Age: 65
End: 2025-06-27
Payer: COMMERCIAL

## 2025-06-27 DIAGNOSIS — F32.0 CURRENT MILD EPISODE OF MAJOR DEPRESSIVE DISORDER WITHOUT PRIOR EPISODE: ICD-10-CM

## 2025-06-27 PROCEDURE — 90837 PSYTX W PT 60 MINUTES: CPT | Performed by: PSYCHOLOGIST

## 2025-06-27 NOTE — PROGRESS NOTES
Dictated with Dragon One medical software  10 AM [id number] [facility] for MDD (60 MIN, [phone]).    In-personal.  CBT/Supportive. Mood stable.   patient went to the police station following her last meeting in place personnel was receptive to his feedback regarding his neighbor.  He was encouraged to call when he began having problems and felt comfortable with the feedback he got.  He did report there is no ordinance that a certain time is considered commonsense but the  gave him some guidelines   For when neighbor should be more quiet.  Discussed his involvement in the [employer] run several weeks ago which was a good experience for him, spending time with his niece and family and then going out after.  Discussed the importance of reflecting on positive events that happened to help counterbalance negative events.  Discussed use of thought records and the importance of capturing thoughts when distressed and trying to respond to them constructively.  Patient has been having some groin pain and an ultrasound revealed cysts in his testicles which he will get more feedback about. Next: 3 weeks.

## 2025-06-30 ENCOUNTER — OFFICE VISIT (OUTPATIENT)
Dept: BEHAVIORAL HEALTH | Facility: CLINIC | Age: 65
End: 2025-06-30
Payer: COMMERCIAL

## 2025-06-30 VITALS
DIASTOLIC BLOOD PRESSURE: 72 MMHG | RESPIRATION RATE: 18 BRPM | WEIGHT: 202.1 LBS | SYSTOLIC BLOOD PRESSURE: 115 MMHG | OXYGEN SATURATION: 99 % | BODY MASS INDEX: 27.41 KG/M2 | TEMPERATURE: 97.9 F | HEART RATE: 63 BPM

## 2025-06-30 DIAGNOSIS — F32.4 MAJOR DEPRESSIVE DISORDER WITH SINGLE EPISODE, IN PARTIAL REMISSION: Primary | ICD-10-CM

## 2025-06-30 DIAGNOSIS — F32.0 MAJOR DEPRESSIVE DISORDER, SINGLE EPISODE, MILD: ICD-10-CM

## 2025-06-30 PROCEDURE — 99213 OFFICE O/P EST LOW 20 MIN: CPT | Mod: AM | Performed by: PSYCHIATRY & NEUROLOGY

## 2025-06-30 PROCEDURE — 1036F TOBACCO NON-USER: CPT | Performed by: PSYCHIATRY & NEUROLOGY

## 2025-06-30 PROCEDURE — 99213 OFFICE O/P EST LOW 20 MIN: CPT | Performed by: PSYCHIATRY & NEUROLOGY

## 2025-06-30 RX ORDER — BUPROPION HYDROCHLORIDE 150 MG/1
150 TABLET, EXTENDED RELEASE ORAL EVERY MORNING
Qty: 90 TABLET | Refills: 1 | Status: SHIPPED | OUTPATIENT
Start: 2025-06-30 | End: 2025-12-27

## 2025-06-30 ASSESSMENT — PAIN SCALES - GENERAL: PAINLEVEL_OUTOF10: 0-NO PAIN

## 2025-06-30 NOTE — PROGRESS NOTES
Patient  Juan Miguel Ballesteros is a 65 y.o. male, presented for   Chief Complaint   Patient presents with    Follow-up     Fuv    .      History of presenting Illness:       Mood and anxiety are the same. He has been in individual therapy. No change in depressive symptoms. Still sleeping well, energy and appetite are good. Denies suicidal ideas, intent or plans.     Past Medical History  Medical History[1]    Surgical History  Surgical History[2]     Family History:  Family History[3]    Social History:  Social History     Socioeconomic History    Marital status:      Spouse name: Not on file    Number of children: Not on file    Years of education: Not on file    Highest education level: Not on file   Occupational History    Not on file   Tobacco Use    Smoking status: Never    Smokeless tobacco: Never   Substance and Sexual Activity    Alcohol use: Never    Drug use: Never    Sexual activity: Defer   Other Topics Concern    Not on file   Social History Narrative    Not on file     Social Drivers of Health     Financial Resource Strain: Not on file   Food Insecurity: Not on file   Transportation Needs: Not on file   Physical Activity: Not on file   Stress: Not on file   Social Connections: Not on file   Intimate Partner Violence: Not on file   Housing Stability: Not on file         Medication:  Current Outpatient Medications   Medication Instructions    buPROPion SR (WELLBUTRIN SR) 150 mg, oral, Every morning    fluticasone (Flonase) 50 mcg/actuation nasal spray 2 sprays, Each Nostril, Daily, Shake gently. Before first use, prime pump. After use, clean tip and replace cap.    halobetasol propionate (Bryhali) 0.01 % lotion Bryhali ; 0 Lotion prn Quantity: 0 Refills: 0 Ordered: 8-Apr-2021 Gabriela Gillespie Generic Substitution Allowed    multivit,calc,min-FA-K1-lycop (One-A-Day Men's Complete) 240 mcg-30 mcg- 300 mcg tablet Take by mouth.    sildenafil (Viagra) 50 mg tablet Every 24 hours    valACYclovir (Valtrex) 1  "gram tablet TAKE TWO TABLETS BY MOUTH TWICE A DAY AT FIRST SYMPTOM ONSET        Allergies  .Allergies[4]     Vitals:  Visit Vitals  /72   Pulse 63   Temp 36.6 °C (97.9 °F)   Resp 18   Wt 91.7 kg (202 lb 1.6 oz)   SpO2 99%   BMI 27.41 kg/m²   Smoking Status Never   BSA 2.16 m²        Mental Status Exam  General: Appears stated age, dressed appropriately  Appearance: Fair hygiene and grooming.  Attitude: Pleasant  Behavior: Cooperative  Motor Activity: WNL  Speech: Soft, normal rate, rhythm and volume.  Mood: \"same\"  Affect: Congruent  Thought Process: Linear and goal directed  Thought Content: Denies suicidal or homicidal ideas, intent or plans. No IOR or delusions.  Thought Perception: No perceptual abnormalities.  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 Depression appears to be multifactorial, as his cancer recurred when he decided to take care home and has also been struggling financially. He was seen for a few months in 2022, was not amenable to antidepressant medication at the time and worked on CBT with some imprvement. HE was started on wellbutrin in 2023. He returns to clinic after 7-8 months. He is not engaged in therapy anymore. Continues to have anxiety and depressive symptoms, without significant functional impairment.      Discussed r/b/a of medication in detail, answered all his questions to his satisfaction.      Plan:  1. Major depressive disorder, " recrrent, moderate; SIMONA  - Continue bupropion SR 150mg PO QAM  - Individual therapy, he continues with Dr. Miller  - No acute safety issues.  - Not interested in medication for sleep.     Medication Consent  Medication Consent: risks, benefits, side effects reviewed for all ordered meds    Please schedule a follow-up with your PCP for your ongoing medical problems.    Dr. Becker is in office on Mon- Thu. Phone calls may not be returned until next day I am in office.   For scheduling questions: [phone]  For other questions: [phone]       For [facility], [email or web address] is a 24/7 hotline that you can call for assistance: [phone].     Please call 9-1-1 or go to the nearest emergency room if you feel worse or have thoughts of hurting yourself or anyone else, or hearing voices, seeing visions or having new or scary thoughts about people around you.    I spent    20 minutes in the professional and overall care of this patient.    Ricardo Becker MD             [1]   Past Medical History:  Diagnosis Date    Chronic maxillary sinusitis 04/05/2022    Chronic maxillary sinusitis    Constipation 08/27/2023    GERD (gastroesophageal reflux disease) 08/27/2023    Localized swelling, mass and lump, head 06/16/2022    Facial swelling    Major depressive disorder, single episode, mild 08/27/2023    Nausea 08/27/2023    Personal history of other malignant neoplasm of skin     History of other malignant neoplasm of skin    Personal history of other specified conditions 12/14/2021    History of nasal congestion    Sensorineural hearing loss, bilateral 11/29/2021    Sensorineural hearing loss (SNHL) of both ears    Squamous cell carcinoma of skin of other parts of face 10/11/2022    Squamous cell carcinoma of skin of orbital rim    Tinnitus, bilateral 11/29/2021    Tinnitus of both ears    Unspecified ectropion of unspecified eye, unspecified eyelid 06/16/2022    Ectropion    Unspecified epiphora, right side  08/18/2022    Epiphora of right side    Unspecified epiphora, unspecified side 04/11/2022    Epiphora    Weakness of shoulder 08/27/2023   [2]   Past Surgical History:  Procedure Laterality Date    OTHER SURGICAL HISTORY  04/02/2021    Tonsillectomy   [3]   Family History  Problem Relation Name Age of Onset    Uterine cancer Mother      Lymphoma Father     [4] No Known Allergies

## 2025-07-17 ASSESSMENT — DUKE ACTIVITY SCORE INDEX (DASI)
CAN YOU DO LIGHT WORK AROUND THE HOUSE LIKE DUSTING OR WASHING DISHES: YES
TOTAL_SCORE: 42.7
CAN YOU DO MODERATE WORK AROUND THE HOUSE LIKE VACUUMING, SWEEPING FLOORS OR CARRYING GROCERIES: YES
CAN YOU PARTICIPATE IN MODERATE RECREATIONAL ACTIVITIES LIKE GOLF, BOWLING, DANCING, DOUBLES TENNIS OR THROWING A BASEBALL OR FOOTBALL: YES
CAN YOU CLIMB A FLIGHT OF STAIRS OR WALK UP A HILL: YES
DASI METS SCORE: 8
CAN YOU PARTICIPATE IN STRENOUS SPORTS LIKE SWIMMING, SINGLES TENNIS, FOOTBALL, BASKETBALL, OR SKIING: NO
CAN YOU WALK INDOORS, SUCH AS AROUND YOUR HOUSE: YES
CAN YOU DO YARD WORK LIKE RAKING LEAVES, WEEDING OR PUSHING A MOWER: YES
CAN YOU RUN A SHORT DISTANCE: NO
CAN YOU WALK A BLOCK OR TWO ON LEVEL GROUND: YES
CAN YOU TAKE CARE OF YOURSELF (EAT, DRESS, BATHE, OR USE TOILET): YES
CAN YOU DO HEAVY WORK AROUND THE HOUSE LIKE SCRUBBING FLOORS OR LIFTING AND MOVING HEAVY FURNITURE: YES
CAN YOU HAVE SEXUAL RELATIONS: YES

## 2025-07-17 ASSESSMENT — ACTIVITIES OF DAILY LIVING (ADL): ADL_SCORE: 0

## 2025-07-17 ASSESSMENT — LIFESTYLE VARIABLES: SMOKING_STATUS: NONSMOKER

## 2025-07-17 NOTE — H&P (VIEW-ONLY)
CPM/PAT Evaluation       Name: Juan Miguel Ballesteros (Juan Miguel Ballesteros)  /Age: 1960/65 y.o.     In-Person     HPI 64 yo male is here today for preop evaluation for epididymis spermatocele. Initially noticed in 2016 but since has become larger and now feels second cyst and has discomfort. No urgency, frequency or burning.    Medical History[1]    Surgical History[2]    Patient Sexual activity questions deferred to the physician.    Family History[3]    Allergies[4]    Prior to Admission medications   Medication Sig Start Date End Date Taking? Authorizing Provider   buPROPion SR (Wellbutrin SR) 150 mg 12 hr tablet Take 1 tablet (150 mg) by mouth once daily in the morning. 25  Ricardo Becker MD   fluticasone (Flonase) 50 mcg/actuation nasal spray Administer 2 sprays into each nostril once daily. Shake gently. Before first use, prime pump. After use, clean tip and replace cap.  Patient not taking: Reported on 2025 12/10/24 12/10/25  Edward Pennington MD   halobetasol propionate (Bryhali) 0.01 % lotion Bryhali ; 0 Lotion prn Quantity: 0 Refills: 0 Ordered: 2021 Gabriela Gillespie Generic Substitution Allowed    Historical Provider, MD   multivit,calc,min-FA-K1-lycop (One-A-Day Men's Complete) 240 mcg-30 mcg- 300 mcg tablet Take by mouth.    Historical Provider, MD   sildenafil (Viagra) 50 mg tablet once every 24 hours. 23   Historical Provider, MD   valACYclovir (Valtrex) 1 gram tablet TAKE TWO TABLETS BY MOUTH TWICE A DAY AT FIRST SYMPTOM ONSET 23   Historical Provider, MD      Constitutional: Negative for fever, chills, or sweats   ENMT: Negative for nasal discharge, congestion, ear pain, mouth pain, throat pain   Respiratory: Negative for cough, wheezing, shortness of breath   Cardiac: Negative for chest pain, dyspnea on exertion, palpitations   Gastrointestinal: Negative for nausea, vomiting, diarrhea, constipation, abdominal pain  Genitourinary:see hpi. Negative for dysuria, flank pain,  frequency, hematuria  Musculoskeletal: Negative for decreased ROM, pain, swelling, weakness  Neurological: Negative for dizziness, confusion, headache  Psychiatric: Negative for mood changes   Skin: Negative for itching, rash, ulcer    Hematologic/Lymph: Negative for bruising, easy bleeding  Allergic/Immunologic: Negative itching, sneezing, swelling    CONSTITUTIONAL - well nourished, well developed, looks like stated age  SKIN - right cheek area and nares surgical changes, normal skin color and pigmentation, no rash or lesions  HEAD - no trauma, normocephalic  EYES - glasses, pupils are equal and reactive to light, extraocular muscles are intact  ENT - uvula midline, normal tongue movement and throat normal, no exudate, nasal passage without discharge and patent  NECK - supple without rigidity, full ROM  CHEST - clear to auscultation, no wheezing, no crackles and no rales, good effort  CARDIAC - regular rate and regular rhythm, no skipped beats, no murmur  ABDOMEN - no organomegaly, soft, nontender, nondistended, normal bowel sounds, no guarding/rebound/rigidity   EXTREMITIES - no edema, no deformities  NEUROLOGICAL - normal gait, normal balance, normal motor; alert, oriented and no focal signs  PSYCHIATRIC - alert, pleasant and cordial, age-appropriate  IMMUNOLOGIC - no cervical lymphadenopathy      PAT AIRWAY:   Airway:     Mallampati::  II    Neck ROM::  Full  normal        Visit Vitals  /70   Pulse 61   Temp 36.4 °C (97.5 °F) (Temporal)   Resp 16   Ht 1.829 m (6')   Wt 92.7 kg (204 lb 5.9 oz)   SpO2 97%   BMI 27.72 kg/m²   Smoking Status Never   BSA 2.17 m²       DASI Risk Score      Flowsheet Row Pre-Admission Testing from 7/18/2025 in [facility] [place]   Can you take care of yourself (eat, dress, bathe, or use toilet)?  2.75 filed at 07/17/2025 1000   Can you walk indoors, such as around your house? 1.75 filed at 07/17/2025 1000   Can you walk a block or two on level ground?  2.75 filed at  07/17/2025 1000   Can you climb a flight of stairs or walk up a hill? 5.5 filed at 07/17/2025 1000   Can you run a short distance? 0 filed at 07/17/2025 1000   Can you do light work around the house like dusting or washing dishes? 2.7 filed at 07/17/2025 1000   Can you do moderate work around the house like vacuuming, sweeping floors or carrying groceries? 3.5 filed at 07/17/2025 1000   Can you do heavy work around the house like scrubbing floors or lifting and moving heavy furniture?  8 filed at 07/17/2025 1000   Can you do yard work like raking leaves, weeding or pushing a mower? 4.5 filed at 07/17/2025 1000   Can you have sexual relations? 5.25 filed at 07/17/2025 1000   Can you participate in moderate recreational activities like golf, bowling, dancing, doubles tennis or throwing a baseball or football? 6 filed at 07/17/2025 1000   Can you participate in strenous sports like swimming, singles tennis, football, basketball, or skiing? 0 filed at 07/17/2025 1000   DASI SCORE 42.7 filed at 07/17/2025 1000   METS Score (Will be calculated only when all the questions are answered) 8 filed at 07/17/2025 1000     Caprini DVT Assessment      Flowsheet Row Pre-Admission Testing from 7/18/2025 in [facility]   Medical Factors Present cancer, chemotherapy, or previous malignancy filed at 07/17/2025 0958   Surgical Factors Minor surgery planned filed at 07/17/2025 0958     Modified Frailty Index      Flowsheet Row Pre-Admission Testing from 7/18/2025 in [facility]   Non-independent functional status (problems with dressing, bathing, personal grooming, or cooking) 0 filed at 07/17/2025 1000   History of diabetes mellitus  0 filed at 07/17/2025 1000   History of COPD 0 filed at 07/17/2025 1000   History of CHF No filed at 07/17/2025 1000   History of MI 0 filed at 07/17/2025 1000   History of Percutaneous Coronary Intervention, Cardiac Surgery, or Angina No filed at 07/17/2025 1000   Hypertension  requiring the use of medication  0 filed at 07/17/2025 1000   Peripheral vascular disease 0 filed at 07/17/2025 1000   Impaired sensorium (cognitive impairement or loss, clouding, or delirium) 0 filed at 07/17/2025 1000   TIA or CVA withouy residual deficit 0 filed at 07/17/2025 1000   Cerebrovascular accident with deficit 0 filed at 07/17/2025 1000   Modified Frailty Index Calculator 0 filed at 07/17/2025 1000     [id number] Stroke Risk Points  Current as of just now        N/A 0 to 9 Points:      Last Change: N/A          The [id number] risk score (Lip RAMILA, et al. 2009. © 2010 [employer]) quantifies the risk of stroke for a patient with atrial fibrillation. For patients without atrial fibrillation or under the age of 18 this score appears as N/A. Higher score values generally indicate higher risk of stroke.        This score is not applicable to this patient. Components are not calculated.          Revised Cardiac Risk Index      Flowsheet Row Pre-Admission Testing from 7/18/2025 in [facility]   High-Risk Surgery (Intraperitoneal, Intrathoracic,Suprainguinal vascular) 0 filed at 07/17/2025 1000   History of ischemic heart disease (History of MI, History of positive exercuse test, Current chest paint considered due to myocardial ischemia, Use of nitrate therapy, ECG with pathological Q Waves) 0 filed at 07/17/2025 1000   History of congestive heart failure (pulmonary edemia, bilateral rales or S3 gallop, Paroxysmal nocturnal dyspnea, CXR showing pulmonary vascular redistribution) 0 filed at 07/17/2025 1000   History of cerebrovascular disease (Prior TIA or stroke) 0 filed at 07/17/2025 1000   Pre-operative insulin treatment 0 filed at 07/17/2025 1000   Pre-operative creatinine>2 mg/dl 0 filed at 07/17/2025 1000   Revised Cardiac Risk Calculator 0 filed at 07/17/2025 1000     Apfel Simplified Score      Flowsheet Row Pre-Admission Testing from 7/18/2025 in [facility]   Smoking status 1 filed at 2025 1000   History of motion sickness or PONV  0 filed at 2025 1000   Use of postoperative opioids 0 filed at 2025 1000   Gender - Female 0=No filed at 2025 1000   Apfel Simplified Score Calculator 1 filed at 2025 1000     Risk Analysis Index Results This Encounter         2025  1000             Do you live in a place other than your own home?: 0    When did you begin living in the place you are currently residing?: Greater than one year ago    Any kidney failure, kidney not working well, or seeing a kidney doctor (nephrologist)? If yes, was this for kidney stones or another problem?: 0 No    Any history of chronic (long-term) congestive heart failure (CHF)?: 0 No    Any shortness of breath when resting?: 0 No    In the past five years, have you been diagnosed with or treated for cancer?: Yes    During the last 3 months has it become difficult for you to remember things or organize your thoughts?: 0 No    Have you lost weight of 10 pounds or more in the past 3 months without trying?: 0 No    Do you have any loss of appetitie?: 0 No    Getting Around (Mobility): 0 Can get around without help    Eatin Can plan and prepare own meals    Toiletin Can use toilet without any help    Personal Hygiene (Bathing, Hand Washing, Changing Clothes): 0 Can shower or bathe without any help    HOPKINS Cancer History: Patient indicates history of cancer    Total Risk Analysis Index Score Without Cancer: 21    Total Risk Analysis Index Score: 36          Stop Bang Score      Flowsheet Row Pre-Admission Testing from 2025 in [facility] [place]   Do you snore loudly? 0 filed at 2025 0959   Do you often feel tired or fatigued after your sleep? 0 filed at 2025 0959   Has anyone ever observed you stop breathing in your sleep? 0 filed at 2025 0959   Do you have or are you being treated for high blood pressure? 0 filed at  07/17/2025 0959   Recent BMI (Calculated) 27.4 filed at 07/17/2025 0959   Is BMI greater than 35 kg/m2? 0=No filed at 07/17/2025 0959   Age older than 50 years old? 1=Yes filed at 07/17/2025 0959   Is your neck circumference greater than 17 inches (Male) or 16 inches (Female)? 0 filed at 07/17/2025 0959   Gender - Male 1=Yes filed at 07/17/2025 0959   STOP-BANG Total Score 2 filed at 07/17/2025 0959     Prodigy: High Risk  Total Score: 16              Prodigy Age Score      Prodigy Gender Score          ARISCAT Score for Postoperative Pulmonary Complications      Flowsheet Row Pre-Admission Testing from 7/18/2025 in [facility] [place]   Age Calculated Score 3 filed at 07/17/2025 1001   Preoperative SpO2 0 filed at 07/17/2025 1001   Respiratory infection in the last month Either upper or lower (i.e., URI, bronchitis, pneumonia), with fever and antibiotic treatment 0 filed at 07/17/2025 1001   Preoperative anemia (Hgb less than 10 g/dl) 0 filed at 07/17/2025 1001   Surgical incision  0 filed at 07/17/2025 1001   Duration of surgery  0 filed at 07/17/2025 1001   Emergency Procedure  0 filed at 07/17/2025 1001   ARISCAT Total Score  3 filed at 07/17/2025 1001     Dylon Perioperative Risk for Myocardial Infarction or Cardiac Arrest (XAVI)      Flowsheet Row Pre-Admission Testing from 7/18/2025 in [facility] [place]   Calculated Age Score 1.28 filed at 07/17/2025 1001   Functional Status  0 filed at 07/17/2025 1001   ASA Class  -3.29 filed at 07/17/2025 1001   Creatinine 0 filed at 07/17/2025 1001   Type of Procedure  -0.26 filed at 07/17/2025 1001   XAVI Total Score  -7.52 filed at 07/17/2025 1001   XAVI % 0.05 filed at 07/17/2025 1001       Assessment and Plan:   66 yo male is scheduled for excision of left spermatocele with Dr. Barrios 7/30/2025.      Cardiac  DASI Score: 42.7   MET Score: 8    RCRI  0 which is 3.9% 30 day risk of MACE (risk for cardiac death, nonfatal myocardial infarction, and nonfactal  cardiac arrest)  XAVI score which indicates a  0.05 % risk of intraoperative or 30-day postoperative MACE    Preoperative deep breathing educational handout provided to patient.  STOP BAN  points which is a low risk for moderate to severe ADRIANNA  ARISCAT:   3   points which is a lowrisk of in-hospital post-op pulmonary complications     Apfel: 1 points 21% risk for post operative N/V    Skin check: Patient was instructed to make surgeon aware of any skin changes/concerns prior to surgery.     Anesthesia:  Patient denies any anesthesia complications.     See risk scores as previously documented.     I spent a total of  28  minutes on the date of the service which included preparing to see the patient, face-to-face patient care,  performing a physical examination, completing clinical documentation, discussing assessment and plan with the patient/family/caregiver, educating the patient/family/caregiver on pre-operative medication instructions and ordering tests if applicable.                   [1]   Past Medical History:  Diagnosis Date    Chronic maxillary sinusitis 2022    Chronic maxillary sinusitis    Constipation 2023    GERD (gastroesophageal reflux disease) 2023    Localized swelling, mass and lump, head 2022    Facial swelling    Major depressive disorder, single episode, mild 2023    Nausea 2023    Personal history of other malignant neoplasm of skin     History of other malignant neoplasm of skin    Personal history of other specified conditions 2021    History of nasal congestion    Sensorineural hearing loss, bilateral 2021    Sensorineural hearing loss (SNHL) of both ears    Squamous cell carcinoma of skin of other parts of face 10/11/2022    Squamous cell carcinoma of skin of orbital rim    Tinnitus, bilateral 2021    Tinnitus of both ears    Unspecified ectropion of unspecified eye, unspecified eyelid 2022    Ectropion    Unspecified  epiphora, right side 08/18/2022    Epiphora of right side    Unspecified epiphora, unspecified side 04/11/2022    Epiphora    Weakness of shoulder 08/27/2023   [2]   Past Surgical History:  Procedure Laterality Date    COLONOSCOPY      OTHER SURGICAL HISTORY  04/02/2021    Tonsillectomy    SQUAMOUS CELL CARCINOMA EXCISION      x4   [3]   Family History  Problem Relation Name Age of Onset    Uterine cancer Mother      Lymphoma Father     [4] No Known Allergies

## 2025-07-18 ENCOUNTER — PRE-ADMISSION TESTING (OUTPATIENT)
Dept: PREADMISSION TESTING | Facility: HOSPITAL | Age: 65
End: 2025-07-18
Payer: COMMERCIAL

## 2025-07-18 ENCOUNTER — APPOINTMENT (OUTPATIENT)
Dept: BEHAVIORAL HEALTH | Facility: CLINIC | Age: 65
End: 2025-07-18
Payer: COMMERCIAL

## 2025-07-18 VITALS
TEMPERATURE: 97.5 F | DIASTOLIC BLOOD PRESSURE: 70 MMHG | WEIGHT: 204.37 LBS | BODY MASS INDEX: 27.68 KG/M2 | SYSTOLIC BLOOD PRESSURE: 137 MMHG | RESPIRATION RATE: 16 BRPM | HEIGHT: 72 IN | OXYGEN SATURATION: 97 % | HEART RATE: 61 BPM

## 2025-07-18 DIAGNOSIS — N43.40 SPERMATOCELE OF EPIDIDYMIS: ICD-10-CM

## 2025-07-18 DIAGNOSIS — Z01.818 PREOP EXAMINATION: Primary | ICD-10-CM

## 2025-07-18 PROCEDURE — 99202 OFFICE O/P NEW SF 15 MIN: CPT | Performed by: NURSE PRACTITIONER

## 2025-07-18 ASSESSMENT — PAIN - FUNCTIONAL ASSESSMENT: PAIN_FUNCTIONAL_ASSESSMENT: 0-10

## 2025-07-30 ENCOUNTER — ANESTHESIA (OUTPATIENT)
Dept: OPERATING ROOM | Facility: HOSPITAL | Age: 65
End: 2025-07-30
Payer: COMMERCIAL

## 2025-07-30 ENCOUNTER — ANESTHESIA EVENT (OUTPATIENT)
Dept: OPERATING ROOM | Facility: HOSPITAL | Age: 65
End: 2025-07-30
Payer: COMMERCIAL

## 2025-07-30 ENCOUNTER — HOSPITAL ENCOUNTER (OUTPATIENT)
Facility: HOSPITAL | Age: 65
Setting detail: OUTPATIENT SURGERY
Discharge: HOME | End: 2025-07-30
Attending: UROLOGY | Admitting: UROLOGY
Payer: COMMERCIAL

## 2025-07-30 ENCOUNTER — PHARMACY VISIT (OUTPATIENT)
Dept: PHARMACY | Facility: CLINIC | Age: 65
End: 2025-07-30
Payer: MEDICARE

## 2025-07-30 VITALS
TEMPERATURE: 96.8 F | OXYGEN SATURATION: 99 % | HEIGHT: 72 IN | SYSTOLIC BLOOD PRESSURE: 163 MMHG | BODY MASS INDEX: 27.77 KG/M2 | HEART RATE: 66 BPM | RESPIRATION RATE: 19 BRPM | DIASTOLIC BLOOD PRESSURE: 78 MMHG | WEIGHT: 205 LBS

## 2025-07-30 DIAGNOSIS — N43.40 SPERMATOCELE: Primary | ICD-10-CM

## 2025-07-30 DIAGNOSIS — N43.40 SPERMATOCELE OF EPIDIDYMIS: ICD-10-CM

## 2025-07-30 PROCEDURE — 2720000007 HC OR 272 NO HCPCS: Performed by: UROLOGY

## 2025-07-30 PROCEDURE — A54840 PR EXCIS SPERMATOCELE: Performed by: ANESTHESIOLOGY

## 2025-07-30 PROCEDURE — 3700000002 HC GENERAL ANESTHESIA TIME - EACH INCREMENTAL 1 MINUTE: Performed by: UROLOGY

## 2025-07-30 PROCEDURE — 3600000007 HC OR TIME - EACH INCREMENTAL 1 MINUTE - PROCEDURE LEVEL TWO: Performed by: UROLOGY

## 2025-07-30 PROCEDURE — 88304 TISSUE EXAM BY PATHOLOGIST: CPT | Performed by: PATHOLOGY

## 2025-07-30 PROCEDURE — 7100000002 HC RECOVERY ROOM TIME - EACH INCREMENTAL 1 MINUTE: Performed by: UROLOGY

## 2025-07-30 PROCEDURE — 2500000004 HC RX 250 GENERAL PHARMACY W/ HCPCS (ALT 636 FOR OP/ED): Mod: JZ | Performed by: ANESTHESIOLOGY

## 2025-07-30 PROCEDURE — 0752T DGTZ GLS MCRSCP SLD LVL III: CPT | Mod: TC,STJLAB | Performed by: UROLOGY

## 2025-07-30 PROCEDURE — 3700000001 HC GENERAL ANESTHESIA TIME - INITIAL BASE CHARGE: Performed by: UROLOGY

## 2025-07-30 PROCEDURE — 7100000001 HC RECOVERY ROOM TIME - INITIAL BASE CHARGE: Performed by: UROLOGY

## 2025-07-30 PROCEDURE — 2500000004 HC RX 250 GENERAL PHARMACY W/ HCPCS (ALT 636 FOR OP/ED): Mod: JW

## 2025-07-30 PROCEDURE — A54840 PR EXCIS SPERMATOCELE: Performed by: NURSE ANESTHETIST, CERTIFIED REGISTERED

## 2025-07-30 PROCEDURE — 2500000005 HC RX 250 GENERAL PHARMACY W/O HCPCS: Performed by: ANESTHESIOLOGY

## 2025-07-30 PROCEDURE — 2500000001 HC RX 250 WO HCPCS SELF ADMINISTERED DRUGS (ALT 637 FOR MEDICARE OP): Performed by: ANESTHESIOLOGY

## 2025-07-30 PROCEDURE — 3600000002 HC OR TIME - INITIAL BASE CHARGE - PROCEDURE LEVEL TWO: Performed by: UROLOGY

## 2025-07-30 PROCEDURE — 7100000010 HC PHASE TWO TIME - EACH INCREMENTAL 1 MINUTE: Performed by: UROLOGY

## 2025-07-30 PROCEDURE — RXMED WILLOW AMBULATORY MEDICATION CHARGE

## 2025-07-30 PROCEDURE — 7100000009 HC PHASE TWO TIME - INITIAL BASE CHARGE: Performed by: UROLOGY

## 2025-07-30 RX ORDER — PROPOFOL 10 MG/ML
INJECTION, EMULSION INTRAVENOUS AS NEEDED
Status: DISCONTINUED | OUTPATIENT
Start: 2025-07-30 | End: 2025-07-30

## 2025-07-30 RX ORDER — LIDOCAINE HYDROCHLORIDE 20 MG/ML
INJECTION, SOLUTION EPIDURAL; INFILTRATION; INTRACAUDAL; PERINEURAL AS NEEDED
Status: DISCONTINUED | OUTPATIENT
Start: 2025-07-30 | End: 2025-07-30

## 2025-07-30 RX ORDER — SODIUM CHLORIDE, SODIUM LACTATE, POTASSIUM CHLORIDE, CALCIUM CHLORIDE 600; 310; 30; 20 MG/100ML; MG/100ML; MG/100ML; MG/100ML
125 INJECTION, SOLUTION INTRAVENOUS CONTINUOUS
Status: ACTIVE | OUTPATIENT
Start: 2025-07-30 | End: 2025-07-30

## 2025-07-30 RX ORDER — ACETAMINOPHEN 325 MG/1
650 TABLET ORAL EVERY 4 HOURS PRN
Status: DISCONTINUED | OUTPATIENT
Start: 2025-07-30 | End: 2025-07-30 | Stop reason: HOSPADM

## 2025-07-30 RX ORDER — ONDANSETRON HYDROCHLORIDE 2 MG/ML
4 INJECTION, SOLUTION INTRAVENOUS ONCE AS NEEDED
Status: DISCONTINUED | OUTPATIENT
Start: 2025-07-30 | End: 2025-07-30 | Stop reason: HOSPADM

## 2025-07-30 RX ORDER — SODIUM CHLORIDE, SODIUM LACTATE, POTASSIUM CHLORIDE, CALCIUM CHLORIDE 600; 310; 30; 20 MG/100ML; MG/100ML; MG/100ML; MG/100ML
INJECTION, SOLUTION INTRAVENOUS CONTINUOUS PRN
Status: DISCONTINUED | OUTPATIENT
Start: 2025-07-30 | End: 2025-07-30

## 2025-07-30 RX ORDER — HYDROCODONE BITARTRATE AND ACETAMINOPHEN 5; 325 MG/1; MG/1
1 TABLET ORAL EVERY 4 HOURS PRN
Status: DISCONTINUED | OUTPATIENT
Start: 2025-07-30 | End: 2025-07-30 | Stop reason: HOSPADM

## 2025-07-30 RX ORDER — CEFAZOLIN SODIUM 2 G/100ML
INJECTION, SOLUTION INTRAVENOUS AS NEEDED
Status: DISCONTINUED | OUTPATIENT
Start: 2025-07-30 | End: 2025-07-30

## 2025-07-30 RX ORDER — PROCHLORPERAZINE EDISYLATE 5 MG/ML
2.5 INJECTION INTRAMUSCULAR; INTRAVENOUS
Status: DISCONTINUED | OUTPATIENT
Start: 2025-07-30 | End: 2025-07-30 | Stop reason: HOSPADM

## 2025-07-30 RX ORDER — MIDAZOLAM HYDROCHLORIDE 1 MG/ML
INJECTION, SOLUTION INTRAMUSCULAR; INTRAVENOUS AS NEEDED
Status: DISCONTINUED | OUTPATIENT
Start: 2025-07-30 | End: 2025-07-30

## 2025-07-30 RX ORDER — HYDROMORPHONE HYDROCHLORIDE 0.2 MG/ML
0.1 INJECTION INTRAMUSCULAR; INTRAVENOUS; SUBCUTANEOUS EVERY 5 MIN PRN
Status: DISCONTINUED | OUTPATIENT
Start: 2025-07-30 | End: 2025-07-30 | Stop reason: HOSPADM

## 2025-07-30 RX ORDER — FENTANYL CITRATE 50 UG/ML
INJECTION, SOLUTION INTRAMUSCULAR; INTRAVENOUS AS NEEDED
Status: DISCONTINUED | OUTPATIENT
Start: 2025-07-30 | End: 2025-07-30

## 2025-07-30 RX ORDER — ONDANSETRON HYDROCHLORIDE 2 MG/ML
INJECTION, SOLUTION INTRAVENOUS AS NEEDED
Status: DISCONTINUED | OUTPATIENT
Start: 2025-07-30 | End: 2025-07-30

## 2025-07-30 RX ORDER — OXYCODONE HYDROCHLORIDE 10 MG/1
10 TABLET ORAL EVERY 4 HOURS PRN
Status: DISCONTINUED | OUTPATIENT
Start: 2025-07-30 | End: 2025-07-30 | Stop reason: HOSPADM

## 2025-07-30 RX ORDER — HYDROMORPHONE HYDROCHLORIDE 0.2 MG/ML
0.2 INJECTION INTRAMUSCULAR; INTRAVENOUS; SUBCUTANEOUS EVERY 5 MIN PRN
Status: DISCONTINUED | OUTPATIENT
Start: 2025-07-30 | End: 2025-07-30 | Stop reason: HOSPADM

## 2025-07-30 RX ORDER — HYDROCODONE BITARTRATE AND ACETAMINOPHEN 5; 325 MG/1; MG/1
1 TABLET ORAL EVERY 6 HOURS PRN
Qty: 12 TABLET | Refills: 0 | Status: SHIPPED | OUTPATIENT
Start: 2025-07-30 | End: 2025-08-02

## 2025-07-30 RX ORDER — ALBUTEROL SULFATE 0.83 MG/ML
2.5 SOLUTION RESPIRATORY (INHALATION) ONCE AS NEEDED
Status: DISCONTINUED | OUTPATIENT
Start: 2025-07-30 | End: 2025-07-30 | Stop reason: HOSPADM

## 2025-07-30 RX ORDER — ACETAMINOPHEN 325 MG/1
975 TABLET ORAL ONCE
Status: DISCONTINUED | OUTPATIENT
Start: 2025-07-30 | End: 2025-07-30 | Stop reason: HOSPADM

## 2025-07-30 RX ORDER — CEPHALEXIN 500 MG/1
500 CAPSULE ORAL 2 TIMES DAILY
Qty: 10 CAPSULE | Refills: 0 | Status: SHIPPED | OUTPATIENT
Start: 2025-07-30 | End: 2025-08-04

## 2025-07-30 RX ORDER — HYDRALAZINE HYDROCHLORIDE 20 MG/ML
5 INJECTION INTRAMUSCULAR; INTRAVENOUS EVERY 30 MIN PRN
Status: DISCONTINUED | OUTPATIENT
Start: 2025-07-30 | End: 2025-07-30 | Stop reason: HOSPADM

## 2025-07-30 RX ADMIN — CEFAZOLIN SODIUM 2 G: 2 INJECTION, SOLUTION INTRAVENOUS at 13:27

## 2025-07-30 RX ADMIN — PROPOFOL 200 MG: 10 INJECTION, EMULSION INTRAVENOUS at 13:23

## 2025-07-30 RX ADMIN — MIDAZOLAM 2 MG: 1 INJECTION INTRAMUSCULAR; INTRAVENOUS at 13:19

## 2025-07-30 RX ADMIN — DEXAMETHASONE SODIUM PHOSPHATE 8 MG: 4 INJECTION, SOLUTION INTRAMUSCULAR; INTRAVENOUS at 13:27

## 2025-07-30 RX ADMIN — FENTANYL CITRATE 25 MCG: 50 INJECTION, SOLUTION INTRAMUSCULAR; INTRAVENOUS at 13:23

## 2025-07-30 RX ADMIN — Medication 6 DOSE: at 14:06

## 2025-07-30 RX ADMIN — HYDROMORPHONE HYDROCHLORIDE 0.5 MG: 1 INJECTION, SOLUTION INTRAMUSCULAR; INTRAVENOUS; SUBCUTANEOUS at 14:59

## 2025-07-30 RX ADMIN — HYDROMORPHONE HYDROCHLORIDE 0.5 MG: 1 INJECTION, SOLUTION INTRAMUSCULAR; INTRAVENOUS; SUBCUTANEOUS at 15:11

## 2025-07-30 RX ADMIN — HYDROCODONE BITARTRATE AND ACETAMINOPHEN 1 TABLET: 5; 325 TABLET ORAL at 16:36

## 2025-07-30 RX ADMIN — LIDOCAINE HYDROCHLORIDE 60 MG: 20 INJECTION, SOLUTION EPIDURAL; INFILTRATION; INTRACAUDAL; PERINEURAL at 13:23

## 2025-07-30 RX ADMIN — ONDANSETRON 4 MG: 2 INJECTION, SOLUTION INTRAMUSCULAR; INTRAVENOUS at 13:55

## 2025-07-30 RX ADMIN — FENTANYL CITRATE 75 MCG: 50 INJECTION, SOLUTION INTRAMUSCULAR; INTRAVENOUS at 13:37

## 2025-07-30 RX ADMIN — HYDROMORPHONE HYDROCHLORIDE 0.2 MG: 0.2 INJECTION, SOLUTION INTRAMUSCULAR; INTRAVENOUS; SUBCUTANEOUS at 14:34

## 2025-07-30 RX ADMIN — SODIUM CHLORIDE, POTASSIUM CHLORIDE, SODIUM LACTATE AND CALCIUM CHLORIDE: 600; 310; 30; 20 INJECTION, SOLUTION INTRAVENOUS at 13:20

## 2025-07-30 SDOH — HEALTH STABILITY: MENTAL HEALTH: CURRENT SMOKER: 0

## 2025-07-30 ASSESSMENT — PAIN SCALES - GENERAL
PAINLEVEL_OUTOF10: 6
PAINLEVEL_OUTOF10: 4
PAINLEVEL_OUTOF10: 6
PAINLEVEL_OUTOF10: 3
PAINLEVEL_OUTOF10: 0 - NO PAIN
PAINLEVEL_OUTOF10: 6
PAINLEVEL_OUTOF10: 2

## 2025-07-30 ASSESSMENT — PAIN - FUNCTIONAL ASSESSMENT
PAIN_FUNCTIONAL_ASSESSMENT: 0-10
PAIN_FUNCTIONAL_ASSESSMENT: WONG-BAKER FACES
PAIN_FUNCTIONAL_ASSESSMENT: 0-10
PAIN_FUNCTIONAL_ASSESSMENT: WONG-BAKER FACES
PAIN_FUNCTIONAL_ASSESSMENT: 0-10

## 2025-07-30 ASSESSMENT — COLUMBIA-SUICIDE SEVERITY RATING SCALE - C-SSRS
6. HAVE YOU EVER DONE ANYTHING, STARTED TO DO ANYTHING, OR PREPARED TO DO ANYTHING TO END YOUR LIFE?: NO
1. IN THE PAST MONTH, HAVE YOU WISHED YOU WERE DEAD OR WISHED YOU COULD GO TO SLEEP AND NOT WAKE UP?: NO
2. HAVE YOU ACTUALLY HAD ANY THOUGHTS OF KILLING YOURSELF?: NO

## 2025-07-30 ASSESSMENT — PAIN DESCRIPTION - DESCRIPTORS
DESCRIPTORS: BURNING
DESCRIPTORS: DISCOMFORT

## 2025-07-30 ASSESSMENT — PAIN SCALES - WONG BAKER
WONGBAKER_NUMERICALRESPONSE: NO HURT
WONGBAKER_NUMERICALRESPONSE: HURTS LITTLE BIT
WONGBAKER_NUMERICALRESPONSE: NO HURT

## 2025-07-30 ASSESSMENT — PAIN DESCRIPTION - LOCATION: LOCATION: PENIS

## 2025-07-30 NOTE — ANESTHESIA PROCEDURE NOTES
Airway  Date/Time: 7/30/2025 1:25 PM  Reason: elective    Airway not difficult    Staffing  Performed: PERLITA   Authorized by: Shelley Courtney MD    Performed by: Yobany Lange  Patient location during procedure: OR    Patient Condition  Indications for airway management: anesthesia and airway protection  Patient position: sniffing  Planned trial extubation  Sedation level: deep     Final Airway Details   Preoxygenated: yes  Final airway type: supraglottic airway  Successful airway: classic  Size: 4   Ventilation between attempts: none  Number of attempts at approach: 1  Number of other approaches attempted: 0    Additional Comments  Atraumatic placement; dentition and soft tissue intact

## 2025-07-30 NOTE — ANESTHESIA PREPROCEDURE EVALUATION
Patient: Juan Miguel Ballesteros    Procedure Information       Anesthesia Start Date/Time: 07/30/25 1319    Procedure: EXCISION, SPERMATOCELE (Left)    Location: [facility] OR 09 / Virtual [facility] OR    Surgeons: Mu Barrios MD            Relevant Problems   Neuro   (+) SIMONA (generalized anxiety disorder)   (+) Generalized anxiety disorder   (+) Major depressive disorder   (+) Other disorders of facial nerve      GI   (+) Dysphagia, oral phase      /Renal   (+) Benign prostatic hyperplasia with lower urinary tract symptoms   (+) Urinary tract infectious disease      Endocrine   (+) Non-toxic uninodular goiter      HEENT   (+) Acute sinusitis   (+) Chronic maxillary sinusitis   (+) Hearing loss   (+) Sensorineural hearing loss (SNHL) of both ears      ID   (+) Acute upper respiratory infection   (+) Local infection of wound   (+) Urinary tract infectious disease   (+) Viral warts   (+) Wound infection      Skin   (+) Squamous cell cancer of skin of right cheek   (+) Squamous cell carcinoma of skin of face   (+) Squamous cell carcinoma, face       Clinical information reviewed:   Tobacco  Allergies  Meds  Problems  Med Hx  Surg Hx   Fam Hx  Soc   Hx        NPO Detail:  NPO/Void Status  Carbohydrate Drink Given Prior to Surgery? : N  Date of Last Liquid: 07/30/25  Time of Last Liquid: 0800  Date of Last Solid: 07/29/25  Time of Last Solid: 1900  Last Intake Type: Clear fluids  Time of Last Void: 1030         Physical Exam    Airway  Mallampati: II  TM distance: >3 FB  Neck ROM: full  Mouth opening: 3 or more finger widths     Cardiovascular - normal exam  Rhythm: regular  Rate: normal     Dental - normal exam     Pulmonary - normal examBreath sounds clear to auscultation     Abdominal - normal examAbdomen: soft  Bowel sounds: normal           Anesthesia Plan    History of general anesthesia?: yes  History of complications of general anesthesia?: no    ASA 3     general     The patient is not a current smoker.  Patient was  previously instructed to abstain from smoking on day of procedure.  Patient did not smoke on day of procedure.  Education provided regarding risk of obstructive sleep apnea.  intravenous induction   Postoperative pain plan includes opioids.  Anesthetic plan and risks discussed with patient and spouse.  Use of blood products discussed with patient and spouse who consented to blood products.    Plan discussed with CRNA.

## 2025-07-30 NOTE — ANESTHESIA POSTPROCEDURE EVALUATION
Patient: Juan Miguel Ballesteros    Procedure Summary       Date: 07/30/25 Room / Location: [facility] OR 09 / [facility] OR    Anesthesia Start: 1319 Anesthesia Stop: 1406    Procedure: EXCISION, SPERMATOCELE (Left) Diagnosis:       Spermatocele of epididymis      (Spermatocele of epididymis [N43.40])    Surgeons: Mu Barrios MD Responsible Provider: Shelley Courtney MD    Anesthesia Type: general ASA Status: 3            Anesthesia Type: general    Vitals Value Taken Time   /72 07/30/25 14:06   Temp 36.4 07/30/25 14:06   Pulse 68 07/30/25 14:06   Resp 16 07/30/25 14:06   SpO2 99 07/30/25 14:06       Anesthesia Post Evaluation    Patient location during evaluation: PACU  Patient participation: complete - patient participated  Level of consciousness: sleepy but conscious  Pain management: adequate  Multimodal analgesia pain management approach  Airway patency: patent  Cardiovascular status: acceptable, hemodynamically stable and stable  Respiratory status: acceptable and room air  Hydration status: acceptable  Postoperative Nausea and Vomiting: none        No notable events documented.

## 2025-07-30 NOTE — DISCHARGE INSTRUCTIONS
You may shower the day after surgery, however, do not submerge in a bath or swim for at least 2 weeks.     Resume normal activity in 24 hours.     Follow-up with surgeon in 4 weeks.  Scrotal support during day for 6 weeks and ice packs to scrotum on and off for 48 hrs,  OK to remove dressing and shower in 24 hrs.

## 2025-07-30 NOTE — OP NOTE
EXCISION, SPERMATOCELE (L) Operative Note     Date: 2025  OR Location: [facility] OR    Name: Juan Miguel Ballesteros, : 1960, Age: 65 y.o., MRN: [id number], Sex: male    Diagnosis  Pre-op Diagnosis      * Spermatocele of epididymis [N43.40] Post-op Diagnosis     * Spermatocele of epididymis [N43.40]     Procedures  EXCISION, SPERMATOCELE  07040 - MA EXCISION SPERMATOCELE W/WO EPIDIDYMECTOMY      Surgeons      * Mu Barrios - Primary    Resident/Fellow/Other Assistant:  Surgeons and Role:  * No surgeons found with a matching role *    Staff:   Surgical Assistant:   Circulator: Coral  Scrub Person: Diane  Circulator: Keara Birminghamub Person: Karen    Anesthesia Staff: Anesthesiologist: Shelley Courtney MD  CRNA: JENNIFER Landeros-TATIANA  SRNA: Yobany Lange    Procedure Summary  Anesthesia: Anesthesia type not filed in the log.  ASA: ASA status not filed in the log.  Estimated Blood Loss: 0mL  Intra-op Medications:   Administrations occurring from 1300 to 1415 on 25:   Medication Name Total Dose   ceFAZolin (Ancef) IV 2 g in 100 mL dextrose (iso) - premix 2 g   dexAMETHasone (Decadron) 4 mg/mL IV Syringe 2 mL 8 mg   fentaNYL (Sublimaze) injection 50 mcg/mL 100 mcg   LR infusion Cannot be calculated   lidocaine PF (Xylocaine-MPF) local injection 2 % 60 mg   midazolam (Versed) injection 1 mg/mL 2 mg   ondansetron (Zofran) 2 mg/mL injection 4 mg   propofol (Diprivan) injection 10 mg/mL 200 mg              Anesthesia Record               Intraprocedure I/O Totals       None           Specimen:   ID Type Source Tests Collected by Time   1 : SPERMATOCELE SAC Tissue SPERMATOCELE LEFT SURGICAL PATHOLOGY EXAM Mu Barrios MD 2025 1345                 Drains and/or Catheters: * None in log *    Tourniquet Times:         Implants:     Findings: left spermatocele    Indications: Juan Miguel Ballesteros is an 65 y.o. male who is having surgery for Spermatocele of epididymis [N43.40].     The patient was seen in the  preoperative area. The risks, benefits, complications, treatment options, non-operative alternatives, expected recovery and outcomes were discussed with the patient. The possibilities of reaction to medication, pulmonary aspiration, injury to surrounding structures, bleeding, recurrent infection, the need for additional procedures, failure to diagnose a condition, and creating a complication requiring transfusion or operation were discussed with the patient. The patient concurred with the proposed plan, giving informed consent.  The site of surgery was properly noted/marked if necessary per policy. The patient has been actively warmed in preoperative area. Preoperative antibiotics have been ordered and given within 1 hours of incision. Venous thrombosis prophylaxis have been ordered including bilateral sequential compression devices    Procedure Details: The patient was brought to the operating room and put asleep by anesthesia.  He was positioned supine on the table and the scrotum was shaved prepped and draped in the usual sterile fashion.  A transverse incision was made through the left hemiscrotum with a #15 scalpel blade and scrotal tunics were divided down to the tunica vaginalis.  We made a small nick in the tunica vaginalis then we opened it up for the length of the incision there was just a scant amount of hydrocele fluid which was removed.  We delivered the testicle and discovered a spermatocele of the epididymis.  Using the Bovie and sharp dissection with the Metzenbaum scissors we  the spermatocele from the cord structures and the overlying scrotal tunics and then we divided the spermatocele at the base of the epididymis with the Bovie.  A second smaller spermatocele was excised in a similar fashion and I made a careful check for hemostasis and once all bleeding was contained we returned the testicle into its location in the left scrotal pocket with no tension or torsion on the cord.  We closed  the dartos tunica with 3-0 chromic suture to close the skin with interrupted horizontal mattress sutures of 3-0 chromic and then we covered the incision with a liquid bandage scrotal fluffs and a scrotal support the patient tolerated the procedure well was taken recovery in satisfactory condition  Evidence of Infection: No   Complications:  None; patient tolerated the procedure well.    Disposition: PACU - hemodynamically stable.  Condition: stable         Task Performed by RNFA or Surgical Assistant:            Additional Details:     Attending Attestation: I performed the procedure.    Mu Barrios  Phone Number: [phone]

## 2025-08-01 ENCOUNTER — APPOINTMENT (OUTPATIENT)
Dept: BEHAVIORAL HEALTH | Facility: CLINIC | Age: 65
End: 2025-08-01
Payer: COMMERCIAL

## 2025-08-01 DIAGNOSIS — F32.0 CURRENT MILD EPISODE OF MAJOR DEPRESSIVE DISORDER WITHOUT PRIOR EPISODE: ICD-10-CM

## 2025-08-01 PROCEDURE — 90837 PSYTX W PT 60 MINUTES: CPT | Performed by: PSYCHOLOGIST

## 2025-08-01 NOTE — PROGRESS NOTES
" Dictated with Xenoport  8 AM [id number] [facility] for MDD (60 MIN, [id number]).    In-personal.  CBT/Supportive. Mood stable.  patient reported that he had a cyst removed from his  testicle on Wednesday and is sore and limiting activity.  Procedure went well and doctor informed him there is a very low likelihood that was cancerous.  Will examine anyway just to be sure.  Patient and I discussed his anger.  He reported that he has been more consistently angry and irritable.  Was frustrated last night when the neighbor was banging garbage cans around at 1130.  Tried distraction and to talk himself down but had little success.  Did finally not getting to sleep.  \"Letting things get to me.\"  He then gets frustrated himself for why he is bothered by such things.  We discussed realistic expectations and that it is difficult to regulate thoughts and emotions.  Has been a bit more withdrawn over the past week.  Feels bad that it was not friendly when doing his part-time job and then went back in and apologized even though he had not been rude.  Discussed the importance of engaging activity to get a mood shift.  Also discussed the importance of trying to act opposite to the emotional state.  Discussed the importance of self compassion and not adding insult to injury. Next: 3 weeks.  "

## 2025-08-08 ENCOUNTER — APPOINTMENT (OUTPATIENT)
Dept: RADIOLOGY | Facility: HOSPITAL | Age: 65
End: 2025-08-08
Payer: COMMERCIAL

## 2025-08-08 ENCOUNTER — APPOINTMENT (OUTPATIENT)
Dept: BEHAVIORAL HEALTH | Facility: CLINIC | Age: 65
End: 2025-08-08
Payer: COMMERCIAL

## 2025-08-11 LAB
LABORATORY COMMENT REPORT: NORMAL
PATH REPORT.FINAL DX SPEC: NORMAL
PATH REPORT.GROSS SPEC: NORMAL
PATH REPORT.RELEVANT HX SPEC: NORMAL
PATH REPORT.TOTAL CANCER: NORMAL

## 2025-08-19 ENCOUNTER — HOSPITAL ENCOUNTER (OUTPATIENT)
Dept: RADIOLOGY | Facility: HOSPITAL | Age: 65
Discharge: HOME | End: 2025-08-19
Payer: COMMERCIAL

## 2025-08-19 DIAGNOSIS — G51.9 ABNORMALITY OF FACIAL NERVE: ICD-10-CM

## 2025-08-19 DIAGNOSIS — C44.320 SQUAMOUS CELL CARCINOMA OF SKIN OF FACE: ICD-10-CM

## 2025-08-19 DIAGNOSIS — R51.9 FACIAL PAIN: ICD-10-CM

## 2025-08-19 DIAGNOSIS — R22.0 FACIAL SWELLING: ICD-10-CM

## 2025-08-19 PROCEDURE — 70543 MRI ORBT/FAC/NCK W/O &W/DYE: CPT | Performed by: RADIOLOGY

## 2025-08-19 PROCEDURE — 70553 MRI BRAIN STEM W/O & W/DYE: CPT

## 2025-08-19 PROCEDURE — 70543 MRI ORBT/FAC/NCK W/O &W/DYE: CPT

## 2025-08-19 PROCEDURE — 2550000001 HC RX 255 CONTRASTS: Performed by: OTOLARYNGOLOGY

## 2025-08-19 PROCEDURE — 70553 MRI BRAIN STEM W/O & W/DYE: CPT | Performed by: RADIOLOGY

## 2025-08-19 PROCEDURE — A9585 GADOBUTROL INJECTION: HCPCS | Performed by: OTOLARYNGOLOGY

## 2025-08-19 RX ORDER — GADOBUTROL 604.72 MG/ML
10 INJECTION INTRAVENOUS ONCE
Status: COMPLETED | OUTPATIENT
Start: 2025-08-19 | End: 2025-08-19

## 2025-08-19 RX ADMIN — GADOBUTROL 10 ML: 604.72 INJECTION INTRAVENOUS at 16:15

## 2025-08-22 ENCOUNTER — APPOINTMENT (OUTPATIENT)
Dept: BEHAVIORAL HEALTH | Facility: CLINIC | Age: 65
End: 2025-08-22
Payer: COMMERCIAL

## 2025-08-22 DIAGNOSIS — F32.0 CURRENT MILD EPISODE OF MAJOR DEPRESSIVE DISORDER WITHOUT PRIOR EPISODE: ICD-10-CM

## 2025-08-22 PROCEDURE — 90837 PSYTX W PT 60 MINUTES: CPT | Performed by: PSYCHOLOGIST

## 2025-08-26 ENCOUNTER — RESULTS FOLLOW-UP (OUTPATIENT)
Dept: OTOLARYNGOLOGY | Facility: CLINIC | Age: 65
End: 2025-08-26
Payer: COMMERCIAL

## 2025-08-29 ENCOUNTER — OFFICE VISIT (OUTPATIENT)
Dept: OTOLARYNGOLOGY | Facility: CLINIC | Age: 65
End: 2025-08-29
Payer: COMMERCIAL

## 2025-09-12 ENCOUNTER — APPOINTMENT (OUTPATIENT)
Dept: BEHAVIORAL HEALTH | Facility: CLINIC | Age: 65
End: 2025-09-12
Payer: COMMERCIAL

## 2025-09-16 ENCOUNTER — APPOINTMENT (OUTPATIENT)
Facility: CLINIC | Age: 65
End: 2025-09-16
Payer: COMMERCIAL

## 2025-09-29 ENCOUNTER — APPOINTMENT (OUTPATIENT)
Dept: BEHAVIORAL HEALTH | Facility: HOSPITAL | Age: 65
End: 2025-09-29
Payer: COMMERCIAL

## 2025-10-03 ENCOUNTER — APPOINTMENT (OUTPATIENT)
Dept: BEHAVIORAL HEALTH | Facility: CLINIC | Age: 65
End: 2025-10-03
Payer: COMMERCIAL

## 2025-12-09 ENCOUNTER — APPOINTMENT (OUTPATIENT)
Facility: CLINIC | Age: 65
End: 2025-12-09
Payer: COMMERCIAL

## 2026-02-27 ENCOUNTER — APPOINTMENT (OUTPATIENT)
Dept: OTOLARYNGOLOGY | Facility: CLINIC | Age: 66
End: 2026-02-27
Payer: COMMERCIAL

## (undated) DEVICE — ELECTRODE, ELECTROSURGICAL, NEEDLE, 1.1 IN, LF

## (undated) DEVICE — GLOVE, SURGICAL, PROTEXIS PI BLUE W/NEUTHERA, 8.0, PF, LF

## (undated) DEVICE — TOWEL PACK, STERILE, 4/PACK, BLUE

## (undated) DEVICE — SUPPORTER, ATHLETIC, ADULT, LARGE

## (undated) DEVICE — GLOVE, SURGICAL, PROTEXIS PI MICRO, 8.0, PF, LF

## (undated) DEVICE — SOLUTION, IRRIGATION, SODIUM CHLORIDE 0.9%, 1000 ML, POUR BOTTLE

## (undated) DEVICE — SUTURE, VICRYL, 3-0, 18 IN, PS2, UNDYED

## (undated) DEVICE — Device

## (undated) DEVICE — BOOT, SUTURE-AID, YELLOW, STERILE, LF

## (undated) DEVICE — SUTURE, VICRYL, 2-0, 18 IN, CT-1, UNDYED

## (undated) DEVICE — DRAIN, PENROSE, 0.25 X 12 IN

## (undated) DEVICE — SUTURE, CHROMIC GUT, 3-0, SH 27"

## (undated) DEVICE — BANDAGE, GAUZE, 6 PLY, KERLIX, 4.5 IN X 4.1 YD, AMD, STERILE

## (undated) DEVICE — SPONGE, DISSECTOR, PEANUT, 3/8, STERILE 5 FOAM HOLDER"

## (undated) DEVICE — PREP TRAY, VAGINAL

## (undated) DEVICE — SPEAR, EYE, SURGICAL, WECK-CELL, CELLULOSE